# Patient Record
Sex: FEMALE | Race: WHITE | Employment: FULL TIME | URBAN - METROPOLITAN AREA
[De-identification: names, ages, dates, MRNs, and addresses within clinical notes are randomized per-mention and may not be internally consistent; named-entity substitution may affect disease eponyms.]

---

## 2017-01-26 RX ORDER — ATORVASTATIN CALCIUM 10 MG/1
TABLET, FILM COATED ORAL
Qty: 30 TABLET | Refills: 0 | Status: SHIPPED | OUTPATIENT
Start: 2017-01-26 | End: 2017-02-09

## 2017-01-26 RX ORDER — LISINOPRIL 20 MG/1
TABLET ORAL
Qty: 30 TABLET | Refills: 0 | Status: SHIPPED | OUTPATIENT
Start: 2017-01-26 | End: 2017-02-09

## 2017-02-09 ENCOUNTER — OFFICE VISIT (OUTPATIENT)
Dept: FAMILY MEDICINE CLINIC | Facility: CLINIC | Age: 62
End: 2017-02-09

## 2017-02-09 VITALS
DIASTOLIC BLOOD PRESSURE: 72 MMHG | SYSTOLIC BLOOD PRESSURE: 116 MMHG | HEART RATE: 68 BPM | BODY MASS INDEX: 29 KG/M2 | RESPIRATION RATE: 16 BRPM | WEIGHT: 156 LBS | TEMPERATURE: 98 F

## 2017-02-09 DIAGNOSIS — F43.0 STRESS REACTION: ICD-10-CM

## 2017-02-09 DIAGNOSIS — E55.9 VITAMIN D DEFICIENCY: ICD-10-CM

## 2017-02-09 DIAGNOSIS — I10 BENIGN ESSENTIAL HYPERTENSION: Primary | ICD-10-CM

## 2017-02-09 PROCEDURE — 99213 OFFICE O/P EST LOW 20 MIN: CPT | Performed by: FAMILY MEDICINE

## 2017-02-09 RX ORDER — LISINOPRIL 20 MG/1
TABLET ORAL
Qty: 30 TABLET | Refills: 5 | Status: SHIPPED | OUTPATIENT
Start: 2017-02-09 | End: 2017-08-28

## 2017-02-09 RX ORDER — ATORVASTATIN CALCIUM 10 MG/1
TABLET, FILM COATED ORAL
Qty: 30 TABLET | Refills: 5 | Status: SHIPPED | OUTPATIENT
Start: 2017-02-09 | End: 2017-08-28

## 2017-02-09 NOTE — PROGRESS NOTES
CHIEF COMPLAINT:   Emani Gonzalez a 63year old female is here for followup on HTN  HPI:   Emani Gonzalez has been doing well since the last visit here. Emani Gonzalez  is compliant with hypertensive medication. No chest pain. No dyspnea. blood pressures noted while monitoring at home.

## 2017-04-03 RX ORDER — MOMETASONE FUROATE 1 MG/G
CREAM TOPICAL
Qty: 45 G | Refills: 1 | Status: SHIPPED | OUTPATIENT
Start: 2017-04-03 | End: 2018-02-05

## 2017-04-10 ENCOUNTER — OFFICE VISIT (OUTPATIENT)
Dept: FAMILY MEDICINE CLINIC | Facility: CLINIC | Age: 62
End: 2017-04-10

## 2017-04-10 VITALS
SYSTOLIC BLOOD PRESSURE: 131 MMHG | HEIGHT: 62 IN | DIASTOLIC BLOOD PRESSURE: 80 MMHG | OXYGEN SATURATION: 98 % | RESPIRATION RATE: 16 BRPM | HEART RATE: 67 BPM | TEMPERATURE: 98 F

## 2017-04-10 DIAGNOSIS — J01.80 ACUTE NON-RECURRENT SINUSITIS OF OTHER SINUS: Primary | ICD-10-CM

## 2017-04-10 PROCEDURE — 99213 OFFICE O/P EST LOW 20 MIN: CPT | Performed by: FAMILY MEDICINE

## 2017-04-10 RX ORDER — AMOXICILLIN AND CLAVULANATE POTASSIUM 875; 125 MG/1; MG/1
1 TABLET, FILM COATED ORAL 2 TIMES DAILY
Qty: 20 TABLET | Refills: 0 | Status: SHIPPED | OUTPATIENT
Start: 2017-04-10 | End: 2017-04-20

## 2017-04-10 NOTE — PROGRESS NOTES
CHIEF COMPLAINT:   Nasal congestion/sinus pain  HPI:   Left facial pain behind eye. No fever. Ear pain B/L. Started 16 days ago. Cough improved. No hx sinus surgery. Still with lots of nasal drainage. Not improving.       Current Outpatient Prescriptio headaches or dizziness.     EXAM:   /80 mmHg  Pulse 67  Temp(Src) 97.9 °F (36.6 °C) (Oral)  Resp 16  Ht 62\"  Wt   SpO2 98%  GENERAL: well developed, well nourished,in no apparent distress  SKIN: no rashes,no suspicious lesions  EYES:PERRLA, EOMI,conj

## 2017-08-31 RX ORDER — LISINOPRIL 20 MG/1
TABLET ORAL
Qty: 30 TABLET | Refills: 1 | Status: SHIPPED | OUTPATIENT
Start: 2017-08-31 | End: 2017-09-19

## 2017-08-31 RX ORDER — ATORVASTATIN CALCIUM 10 MG/1
TABLET, FILM COATED ORAL
Qty: 30 TABLET | Refills: 1 | Status: SHIPPED | OUTPATIENT
Start: 2017-08-31 | End: 2017-09-19

## 2017-08-31 NOTE — TELEPHONE ENCOUNTER
Pt called requesting update on RX request. She will be going out ot [facility] and would like to pick them up today. Please advise. Thank you.

## 2017-09-19 ENCOUNTER — OFFICE VISIT (OUTPATIENT)
Dept: FAMILY MEDICINE CLINIC | Facility: CLINIC | Age: 62
End: 2017-09-19

## 2017-09-19 VITALS
DIASTOLIC BLOOD PRESSURE: 84 MMHG | SYSTOLIC BLOOD PRESSURE: 150 MMHG | BODY MASS INDEX: 27.6 KG/M2 | HEART RATE: 68 BPM | TEMPERATURE: 98 F | RESPIRATION RATE: 16 BRPM | HEIGHT: 62 IN | WEIGHT: 150 LBS

## 2017-09-19 DIAGNOSIS — M85.80 OSTEOPENIA, UNSPECIFIED LOCATION: ICD-10-CM

## 2017-09-19 DIAGNOSIS — Z13.89 SCREENING FOR GENITOURINARY CONDITION: ICD-10-CM

## 2017-09-19 DIAGNOSIS — Z00.00 BLOOD TESTS FOR ROUTINE GENERAL PHYSICAL EXAMINATION: ICD-10-CM

## 2017-09-19 DIAGNOSIS — Z11.51 SCREENING FOR HPV (HUMAN PAPILLOMAVIRUS): ICD-10-CM

## 2017-09-19 DIAGNOSIS — Z12.31 ENCOUNTER FOR SCREENING MAMMOGRAM FOR MALIGNANT NEOPLASM OF BREAST: ICD-10-CM

## 2017-09-19 DIAGNOSIS — Z00.00 ROUTINE GENERAL MEDICAL EXAMINATION AT A HEALTH CARE FACILITY: Primary | ICD-10-CM

## 2017-09-19 DIAGNOSIS — Z12.4 PAP SMEAR FOR CERVICAL CANCER SCREENING: ICD-10-CM

## 2017-09-19 DIAGNOSIS — Z13.820 SCREENING FOR OSTEOPOROSIS: ICD-10-CM

## 2017-09-19 PROCEDURE — 87624 HPV HI-RISK TYP POOLED RSLT: CPT | Performed by: FAMILY MEDICINE

## 2017-09-19 PROCEDURE — 88175 CYTOPATH C/V AUTO FLUID REDO: CPT | Performed by: FAMILY MEDICINE

## 2017-09-19 PROCEDURE — 99396 PREV VISIT EST AGE 40-64: CPT | Performed by: FAMILY MEDICINE

## 2017-09-19 RX ORDER — LISINOPRIL 20 MG/1
TABLET ORAL
Qty: 90 TABLET | Refills: 1 | Status: CANCELLED | OUTPATIENT
Start: 2017-09-19

## 2017-09-19 RX ORDER — ATORVASTATIN CALCIUM 10 MG/1
TABLET, FILM COATED ORAL
Qty: 90 TABLET | Refills: 1 | Status: SHIPPED | OUTPATIENT
Start: 2017-09-19 | End: 2018-09-21

## 2017-09-19 RX ORDER — LISINOPRIL AND HYDROCHLOROTHIAZIDE 20; 12.5 MG/1; MG/1
1 TABLET ORAL DAILY
Qty: 90 TABLET | Refills: 0 | Status: SHIPPED | OUTPATIENT
Start: 2017-09-19 | End: 2017-12-13

## 2017-09-19 NOTE — PROGRESS NOTES
CHIEF COMPLAINT       Annual Physical Exam  HPI:   Inga Diaz is a 63year old female who presents for a complete physical exam.     Wt Readings from Last 6 Encounters:  09/19/17 : 150 lb  02/09/17 : 156 lb  07/27/16 : 152 lb  06/29/16 : 153 lb  0 Packs/day: 0.50      Years: 0.00         Types: Cigarettes  Smokeless tobacco: Never Used                      Alcohol use:  Yes              Comment: 1-2 drinks/month/max         REVIEW OF SYSTEMS:   GENERAL: feels well otherwise  SK is 27.44 kg/m². [place] Tram Recommended low fat diet and aerobic exercise 30 minutes three times weekly. The patient indicates understanding of these issues and agrees to the plan. Change to lisinopril HCTZ 20/12.5 once daily.   Follow up for recheck of BP in 6 weeks

## 2017-09-22 LAB — HPV I/H RISK 1 DNA SPEC QL NAA+PROBE: NEGATIVE

## 2017-10-17 ENCOUNTER — LAB ENCOUNTER (OUTPATIENT)
Dept: LAB | Age: 62
End: 2017-10-17
Attending: FAMILY MEDICINE
Payer: COMMERCIAL

## 2017-10-17 DIAGNOSIS — Z13.89 SCREENING FOR GENITOURINARY CONDITION: ICD-10-CM

## 2017-10-17 DIAGNOSIS — E55.9 VITAMIN D DEFICIENCY: ICD-10-CM

## 2017-10-17 DIAGNOSIS — Z00.00 BLOOD TESTS FOR ROUTINE GENERAL PHYSICAL EXAMINATION: ICD-10-CM

## 2017-10-17 PROCEDURE — 82306 VITAMIN D 25 HYDROXY: CPT | Performed by: FAMILY MEDICINE

## 2017-10-17 PROCEDURE — 80061 LIPID PANEL: CPT | Performed by: FAMILY MEDICINE

## 2017-10-17 PROCEDURE — 81001 URINALYSIS AUTO W/SCOPE: CPT | Performed by: FAMILY MEDICINE

## 2017-10-17 PROCEDURE — 80050 GENERAL HEALTH PANEL: CPT | Performed by: FAMILY MEDICINE

## 2017-10-17 PROCEDURE — 36415 COLL VENOUS BLD VENIPUNCTURE: CPT | Performed by: FAMILY MEDICINE

## 2017-12-14 RX ORDER — LISINOPRIL AND HYDROCHLOROTHIAZIDE 20; 12.5 MG/1; MG/1
TABLET ORAL
Qty: 30 TABLET | Refills: 3 | Status: SHIPPED | OUTPATIENT
Start: 2017-12-14 | End: 2018-02-05

## 2018-02-05 ENCOUNTER — OFFICE VISIT (OUTPATIENT)
Dept: FAMILY MEDICINE CLINIC | Facility: CLINIC | Age: 63
End: 2018-02-05

## 2018-02-05 VITALS
SYSTOLIC BLOOD PRESSURE: 86 MMHG | BODY MASS INDEX: 27.77 KG/M2 | DIASTOLIC BLOOD PRESSURE: 56 MMHG | WEIGHT: 149 LBS | TEMPERATURE: 99 F | RESPIRATION RATE: 12 BRPM | HEART RATE: 64 BPM | HEIGHT: 61.5 IN

## 2018-02-05 DIAGNOSIS — L30.9 ECZEMA, UNSPECIFIED TYPE: ICD-10-CM

## 2018-02-05 DIAGNOSIS — I10 BENIGN ESSENTIAL HYPERTENSION: Primary | ICD-10-CM

## 2018-02-05 PROCEDURE — 99213 OFFICE O/P EST LOW 20 MIN: CPT | Performed by: FAMILY MEDICINE

## 2018-02-05 RX ORDER — LISINOPRIL 20 MG/1
20 TABLET ORAL DAILY
Qty: 30 TABLET | Refills: 5 | Status: SHIPPED | OUTPATIENT
Start: 2018-02-05 | End: 2018-07-26

## 2018-02-05 RX ORDER — MOMETASONE FUROATE 1 MG/G
CREAM TOPICAL
Qty: 45 G | Refills: 1 | Status: SHIPPED | OUTPATIENT
Start: 2018-02-05 | End: 2018-09-21

## 2018-02-05 NOTE — PROGRESS NOTES
CHIEF COMPLAINT:   Michael Menard a 63year old female is here for followup on HTN  HPI:   Michael Menard has been doing well since the last visit here. Michael Menard  is compliant with hypertensive medication. No chest pain. No dyspnea. Caution with position changes since her blood pressure has been running low recently.

## 2018-02-16 ENCOUNTER — MED REC SCAN ONLY (OUTPATIENT)
Dept: OBGYN CLINIC | Facility: CLINIC | Age: 63
End: 2018-02-16

## 2018-03-13 ENCOUNTER — PATIENT MESSAGE (OUTPATIENT)
Dept: FAMILY MEDICINE CLINIC | Facility: CLINIC | Age: 63
End: 2018-03-13

## 2018-07-27 RX ORDER — LISINOPRIL 20 MG/1
TABLET ORAL
Qty: 30 TABLET | Refills: 0 | Status: SHIPPED | OUTPATIENT
Start: 2018-07-27 | End: 2018-09-05

## 2018-07-27 NOTE — TELEPHONE ENCOUNTER
Please call pt for medication follow up in 1 month or 2 months for physical with Galen Ahmadi. Thanks.

## 2018-08-27 NOTE — TELEPHONE ENCOUNTER
Not protocol medication. LOV :2/05/18 for hypertension   Last labs done :10/17/17  Next appointment :9/21/18  Please see pending medication. Refill if appropriate.    Last refill:    Date:4/27/18  Amount :30 tablets 3 refills   Medication: sertraline hcl

## 2018-09-06 RX ORDER — LISINOPRIL 20 MG/1
TABLET ORAL
Qty: 30 TABLET | Refills: 0 | Status: SHIPPED | OUTPATIENT
Start: 2018-09-06 | End: 2018-09-21

## 2018-09-21 ENCOUNTER — OFFICE VISIT (OUTPATIENT)
Dept: FAMILY MEDICINE CLINIC | Facility: CLINIC | Age: 63
End: 2018-09-21
Payer: COMMERCIAL

## 2018-09-21 VITALS
DIASTOLIC BLOOD PRESSURE: 76 MMHG | BODY MASS INDEX: 25.95 KG/M2 | SYSTOLIC BLOOD PRESSURE: 118 MMHG | HEIGHT: 62 IN | HEART RATE: 60 BPM | TEMPERATURE: 98 F | RESPIRATION RATE: 12 BRPM | WEIGHT: 141 LBS

## 2018-09-21 DIAGNOSIS — Z00.00 ROUTINE GENERAL MEDICAL EXAMINATION AT A HEALTH CARE FACILITY: Primary | ICD-10-CM

## 2018-09-21 DIAGNOSIS — Z13.89 SCREENING FOR GENITOURINARY CONDITION: ICD-10-CM

## 2018-09-21 DIAGNOSIS — Z00.00 BLOOD TESTS FOR ROUTINE GENERAL PHYSICAL EXAMINATION: ICD-10-CM

## 2018-09-21 DIAGNOSIS — M79.671 RIGHT FOOT PAIN: ICD-10-CM

## 2018-09-21 DIAGNOSIS — Z12.31 ENCOUNTER FOR SCREENING MAMMOGRAM FOR MALIGNANT NEOPLASM OF BREAST: ICD-10-CM

## 2018-09-21 DIAGNOSIS — Z13.820 SCREENING FOR OSTEOPOROSIS: ICD-10-CM

## 2018-09-21 PROCEDURE — 99212 OFFICE O/P EST SF 10 MIN: CPT | Performed by: FAMILY MEDICINE

## 2018-09-21 PROCEDURE — 99396 PREV VISIT EST AGE 40-64: CPT | Performed by: FAMILY MEDICINE

## 2018-09-21 RX ORDER — LISINOPRIL 20 MG/1
TABLET ORAL
Qty: 90 TABLET | Refills: 1 | Status: SHIPPED | OUTPATIENT
Start: 2018-09-21 | End: 2019-03-30

## 2018-09-21 RX ORDER — MOMETASONE FUROATE 1 MG/G
CREAM TOPICAL
Qty: 45 G | Refills: 1 | Status: SHIPPED | OUTPATIENT
Start: 2018-09-21 | End: 2019-04-15

## 2018-09-21 RX ORDER — ATORVASTATIN CALCIUM 10 MG/1
TABLET, FILM COATED ORAL
Qty: 90 TABLET | Refills: 1 | Status: SHIPPED | OUTPATIENT
Start: 2018-09-21 | End: 2019-03-30

## 2018-09-21 NOTE — PROGRESS NOTES
CHIEF COMPLAINT       Annual Physical Exam  HPI:   Jennifer De Santiago is a 63year old female who presents for a complete physical exam.   Abnormal pap in her 25s - had a \"scraping\" - since then normal.    Right foot pain for a couple weeks.   Walking an aneurysm[other]) Father    • Heart Disease Father    • High Cholesterol Mother    • Asthma Maternal Grandfather    • Other (emphysema[other]) Maternal Grandfather    • Other (MS[other]) Brother       Social History:   Social History    Tobacco Use      Smo sensation are intact. No swelling or deformity noted.   EXTREMITIES: no cyanosis, clubbing or edema  NEURO: Oriented times three,cranial nerves are intact,motor and sensory are grossly intact    ASSESSMENT AND PLAN:   Paolo Richard is a 63year old f

## 2018-09-21 NOTE — PATIENT INSTRUCTIONS
Try claritin (loratadine) 10 mg once daily. Check on insurance coverage for Shingrix.   If covered, then ask your insurance if you should do it at the [address] [place] [address] office or pharmacy

## 2018-09-27 ENCOUNTER — HOSPITAL ENCOUNTER (OUTPATIENT)
Dept: GENERAL RADIOLOGY | Age: 63
Discharge: HOME | End: 2018-09-27
Attending: FAMILY MEDICINE
Payer: COMMERCIAL

## 2018-09-27 DIAGNOSIS — M79.671 RIGHT FOOT PAIN: ICD-10-CM

## 2018-09-27 PROCEDURE — 73630 X-RAY EXAM OF FOOT: CPT | Performed by: FAMILY MEDICINE

## 2019-04-01 ENCOUNTER — LAB ENCOUNTER (OUTPATIENT)
Dept: LAB | Age: 64
End: 2019-04-01
Attending: FAMILY MEDICINE
Payer: COMMERCIAL

## 2019-04-01 DIAGNOSIS — Z13.89 SCREENING FOR GENITOURINARY CONDITION: ICD-10-CM

## 2019-04-01 DIAGNOSIS — R94.6 ABNORMAL THYROID FUNCTION TEST: Primary | ICD-10-CM

## 2019-04-01 DIAGNOSIS — R94.6 ABNORMAL THYROID FUNCTION TEST: ICD-10-CM

## 2019-04-01 DIAGNOSIS — Z00.00 BLOOD TESTS FOR ROUTINE GENERAL PHYSICAL EXAMINATION: ICD-10-CM

## 2019-04-01 PROCEDURE — 80050 GENERAL HEALTH PANEL: CPT | Performed by: FAMILY MEDICINE

## 2019-04-01 PROCEDURE — 36415 COLL VENOUS BLD VENIPUNCTURE: CPT | Performed by: FAMILY MEDICINE

## 2019-04-01 PROCEDURE — 80061 LIPID PANEL: CPT | Performed by: FAMILY MEDICINE

## 2019-04-01 PROCEDURE — 84439 ASSAY OF FREE THYROXINE: CPT | Performed by: FAMILY MEDICINE

## 2019-04-01 PROCEDURE — 81003 URINALYSIS AUTO W/O SCOPE: CPT | Performed by: FAMILY MEDICINE

## 2019-04-01 RX ORDER — ATORVASTATIN CALCIUM 10 MG/1
TABLET, FILM COATED ORAL
Qty: 30 TABLET | Refills: 0 | Status: SHIPPED | OUTPATIENT
Start: 2019-04-01 | End: 2019-04-15

## 2019-04-01 RX ORDER — LISINOPRIL 20 MG/1
TABLET ORAL
Qty: 30 TABLET | Refills: 0 | Status: SHIPPED | OUTPATIENT
Start: 2019-04-01 | End: 2019-04-15

## 2019-04-15 ENCOUNTER — OFFICE VISIT (OUTPATIENT)
Dept: FAMILY MEDICINE CLINIC | Facility: CLINIC | Age: 64
End: 2019-04-15
Payer: COMMERCIAL

## 2019-04-15 VITALS
WEIGHT: 140 LBS | HEART RATE: 70 BPM | DIASTOLIC BLOOD PRESSURE: 78 MMHG | RESPIRATION RATE: 14 BRPM | TEMPERATURE: 99 F | HEIGHT: 62 IN | BODY MASS INDEX: 25.76 KG/M2 | SYSTOLIC BLOOD PRESSURE: 110 MMHG

## 2019-04-15 DIAGNOSIS — R79.89 ELEVATED TSH: ICD-10-CM

## 2019-04-15 DIAGNOSIS — F43.0 STRESS REACTION: ICD-10-CM

## 2019-04-15 DIAGNOSIS — E03.9 ACQUIRED HYPOTHYROIDISM: ICD-10-CM

## 2019-04-15 DIAGNOSIS — E78.00 HYPERCHOLESTEREMIA: ICD-10-CM

## 2019-04-15 DIAGNOSIS — I10 BENIGN ESSENTIAL HYPERTENSION: Primary | ICD-10-CM

## 2019-04-15 PROCEDURE — 99214 OFFICE O/P EST MOD 30 MIN: CPT | Performed by: FAMILY MEDICINE

## 2019-04-15 RX ORDER — LISINOPRIL 20 MG/1
TABLET ORAL
Qty: 90 TABLET | Refills: 1 | Status: SHIPPED | OUTPATIENT
Start: 2019-04-15 | End: 2019-10-28

## 2019-04-15 RX ORDER — ATORVASTATIN CALCIUM 10 MG/1
TABLET, FILM COATED ORAL
Qty: 90 TABLET | Refills: 1 | Status: SHIPPED | OUTPATIENT
Start: 2019-04-15 | End: 2019-10-28

## 2019-04-15 RX ORDER — MOMETASONE FUROATE 1 MG/G
CREAM TOPICAL
Qty: 45 G | Refills: 1 | Status: SHIPPED | OUTPATIENT
Start: 2019-04-15 | End: 2019-11-22

## 2019-04-15 NOTE — PATIENT INSTRUCTIONS
Check thyroid labs first part of June. Check on insurance coverage for Shingrix. If covered, then ask your insurance if you should do it at the [address] [place] [address] office or pharmacy. We don't currently have it available in our office.

## 2019-04-15 NOTE — PROGRESS NOTES
CHIEF COMPLAINT:   Kelley Perea a 66year old female is here for followup on HTN  HPI:   Kelley Perea has been doing well since the last visit here. Kelley Perea  is compliant with hypertensive medication. No chest pain. No dyspnea. labs in 2 months including antibodies. Call sooner if any hypothyroidism type of symptoms. Will start medication if TSH is still mildly elevated and T4 is low. Reviewed thyroid pathophysiology with the patient. Continue current management.   Continue to

## 2019-06-19 ENCOUNTER — WALK IN (OUTPATIENT)
Dept: URGENT CARE | Age: 64
End: 2019-06-19

## 2019-06-19 VITALS
HEART RATE: 78 BPM | BODY MASS INDEX: 24.41 KG/M2 | TEMPERATURE: 98.3 F | SYSTOLIC BLOOD PRESSURE: 116 MMHG | DIASTOLIC BLOOD PRESSURE: 58 MMHG | RESPIRATION RATE: 17 BRPM | WEIGHT: 143 LBS | HEIGHT: 64 IN

## 2019-06-19 DIAGNOSIS — B34.9 VIRAL ILLNESS: Primary | ICD-10-CM

## 2019-06-19 PROCEDURE — 99203 OFFICE O/P NEW LOW 30 MIN: CPT | Performed by: NURSE PRACTITIONER

## 2019-06-19 ASSESSMENT — ENCOUNTER SYMPTOMS
SINUS PAIN: 1
RESPIRATORY NEGATIVE: 1
SORE THROAT: 0
FATIGUE: 1
SWOLLEN GLANDS: 0
COUGH: 0
SINUS PRESSURE: 1
ANAL BLEEDING: 0
HEADACHES: 1
ENDOCRINE NEGATIVE: 1
RHINORRHEA: 0
EYES NEGATIVE: 1
FEVER: 0
CHILLS: 0
CONSTIPATION: 0

## 2019-10-30 RX ORDER — ATORVASTATIN CALCIUM 10 MG/1
TABLET, FILM COATED ORAL
Qty: 30 TABLET | Refills: 0 | Status: SHIPPED | OUTPATIENT
Start: 2019-10-30 | End: 2019-11-22

## 2019-10-30 RX ORDER — LISINOPRIL 20 MG/1
TABLET ORAL
Qty: 30 TABLET | Refills: 0 | Status: SHIPPED | OUTPATIENT
Start: 2019-10-30 | End: 2019-11-22

## 2019-11-22 NOTE — PROGRESS NOTES
CHIEF COMPLAINT:   Star Dunham a 60year old female is here for followup on HTN  HPI:   Star Dunham has been doing well since the last visit here. Star Dunham  is compliant with hypertensive medication. No chest pain. No dyspnea. MG Oral Tab 90 tablet 1     Sig: TAKE 1 TABLET BY MOUTH ONE TIME A DAY   • lisinopril 20 MG Oral Tab 90 tablet 1     Sig: TAKE 1 TABLET BY MOUTH ONE TIME A DAY   • atorvastatin 10 MG Oral Tab 90 tablet 1     Sig: TAKE 1 TABLET BY MOUTH DAILY IN THE EVENING

## 2019-11-25 ENCOUNTER — MED REC SCAN ONLY (OUTPATIENT)
Dept: FAMILY MEDICINE CLINIC | Facility: CLINIC | Age: 64
End: 2019-11-25

## 2020-03-02 ENCOUNTER — OFFICE VISIT (OUTPATIENT)
Dept: FAMILY MEDICINE CLINIC | Facility: CLINIC | Age: 65
End: 2020-03-02
Payer: COMMERCIAL

## 2020-03-02 VITALS
SYSTOLIC BLOOD PRESSURE: 130 MMHG | TEMPERATURE: 99 F | HEART RATE: 80 BPM | WEIGHT: 151 LBS | RESPIRATION RATE: 12 BRPM | BODY MASS INDEX: 27.79 KG/M2 | DIASTOLIC BLOOD PRESSURE: 70 MMHG | HEIGHT: 62 IN

## 2020-03-02 DIAGNOSIS — R52 BODY ACHES: ICD-10-CM

## 2020-03-02 DIAGNOSIS — J02.9 SORE THROAT: ICD-10-CM

## 2020-03-02 DIAGNOSIS — R05.9 COUGH: Primary | ICD-10-CM

## 2020-03-02 PROCEDURE — 99213 OFFICE O/P EST LOW 20 MIN: CPT | Performed by: FAMILY MEDICINE

## 2020-03-02 NOTE — PROGRESS NOTES
CHIEF COMPLAINT:   Sick  HPI:   Friend recently diagnosed with influenza A. Started about 5 days ago with sore throat, aching, fatigue. Still with a lot of fatigue and cough starting. Taking Mucinex. Had some sweating at night - didn't check temp.   Feel palpitations.   GI: no complaint of nausea or abdominal pain  NEURO: no complaint of neurologic deficits    EXAM:   /70   Pulse 80   Temp 98.8 °F (37.1 °C) (Oral)   Resp 12   Ht 62\"   Wt 151 lb (68.5 kg)   BMI 27.62 kg/m²   GENERAL: well developed, w

## 2020-03-09 ENCOUNTER — TELEPHONE (OUTPATIENT)
Dept: FAMILY MEDICINE CLINIC | Facility: CLINIC | Age: 65
End: 2020-03-09

## 2020-06-08 RX ORDER — ATORVASTATIN CALCIUM 10 MG/1
TABLET, FILM COATED ORAL
Qty: 90 TABLET | Refills: 0 | Status: SHIPPED | OUTPATIENT
Start: 2020-06-08 | End: 2020-10-09

## 2020-06-08 RX ORDER — LISINOPRIL 20 MG/1
TABLET ORAL
Qty: 90 TABLET | Refills: 0 | Status: SHIPPED | OUTPATIENT
Start: 2020-06-08 | End: 2020-10-09

## 2020-06-17 ENCOUNTER — TELEPHONE (OUTPATIENT)
Dept: FAMILY MEDICINE CLINIC | Facility: CLINIC | Age: 65
End: 2020-06-17

## 2020-06-17 NOTE — TELEPHONE ENCOUNTER
Since there are different brands of nicotine patches available OTC, I would suggest following the instructions on the package since I'm not familiar with all of the brands.

## 2020-07-24 ENCOUNTER — MED REC SCAN ONLY (OUTPATIENT)
Dept: FAMILY MEDICINE CLINIC | Facility: CLINIC | Age: 65
End: 2020-07-24

## 2020-10-09 RX ORDER — ATORVASTATIN CALCIUM 10 MG/1
TABLET, FILM COATED ORAL
Qty: 30 TABLET | Refills: 0 | Status: SHIPPED | OUTPATIENT
Start: 2020-10-09 | End: 2020-10-30

## 2020-10-09 RX ORDER — LISINOPRIL 20 MG/1
TABLET ORAL
Qty: 30 TABLET | Refills: 0 | Status: SHIPPED | OUTPATIENT
Start: 2020-10-09 | End: 2020-10-30

## 2020-10-30 ENCOUNTER — OFFICE VISIT (OUTPATIENT)
Dept: FAMILY MEDICINE CLINIC | Facility: CLINIC | Age: 65
End: 2020-10-30
Payer: COMMERCIAL

## 2020-10-30 VITALS
TEMPERATURE: 98 F | HEART RATE: 76 BPM | RESPIRATION RATE: 12 BRPM | SYSTOLIC BLOOD PRESSURE: 130 MMHG | DIASTOLIC BLOOD PRESSURE: 80 MMHG

## 2020-10-30 DIAGNOSIS — Z12.11 SCREENING FOR COLORECTAL CANCER: ICD-10-CM

## 2020-10-30 DIAGNOSIS — I10 BENIGN ESSENTIAL HYPERTENSION: Primary | ICD-10-CM

## 2020-10-30 DIAGNOSIS — Z23 NEED FOR VACCINATION: ICD-10-CM

## 2020-10-30 DIAGNOSIS — E55.9 VITAMIN D DEFICIENCY: ICD-10-CM

## 2020-10-30 DIAGNOSIS — Z12.12 SCREENING FOR COLORECTAL CANCER: ICD-10-CM

## 2020-10-30 DIAGNOSIS — F43.0 STRESS REACTION: ICD-10-CM

## 2020-10-30 DIAGNOSIS — R79.89 ELEVATED TSH: ICD-10-CM

## 2020-10-30 PROCEDURE — 90686 IIV4 VACC NO PRSV 0.5 ML IM: CPT | Performed by: FAMILY MEDICINE

## 2020-10-30 PROCEDURE — 3079F DIAST BP 80-89 MM HG: CPT | Performed by: FAMILY MEDICINE

## 2020-10-30 PROCEDURE — 3008F BODY MASS INDEX DOCD: CPT | Performed by: FAMILY MEDICINE

## 2020-10-30 PROCEDURE — 3075F SYST BP GE 130 - 139MM HG: CPT | Performed by: FAMILY MEDICINE

## 2020-10-30 PROCEDURE — 99214 OFFICE O/P EST MOD 30 MIN: CPT | Performed by: FAMILY MEDICINE

## 2020-10-30 PROCEDURE — 90471 IMMUNIZATION ADMIN: CPT | Performed by: FAMILY MEDICINE

## 2020-10-30 RX ORDER — ATORVASTATIN CALCIUM 10 MG/1
TABLET, FILM COATED ORAL
Qty: 90 TABLET | Refills: 1 | Status: SHIPPED | OUTPATIENT
Start: 2020-10-30 | End: 2021-05-25

## 2020-10-30 RX ORDER — MOMETASONE FUROATE 1 MG/G
CREAM TOPICAL
Qty: 45 G | Refills: 1 | Status: SHIPPED | OUTPATIENT
Start: 2020-10-30 | End: 2021-08-24

## 2020-10-30 RX ORDER — LISINOPRIL 20 MG/1
20 TABLET ORAL DAILY
Qty: 90 TABLET | Refills: 1 | Status: SHIPPED | OUTPATIENT
Start: 2020-10-30 | End: 2021-05-24

## 2020-10-30 NOTE — PROGRESS NOTES
CHIEF COMPLAINT:   Star Dunham a 63year old female is here for followup on HTN  HPI:   Star Dunham has been doing well since the last visit here. Star Dunham  is compliant with hypertensive medication. No chest pain. No dyspnea. BY MOUTH DAILY IN THE EVENING   • Mometasone Furoate 0.1 % External Cream 45 g 1     Sig: Apply BID. Max two weeks. Schedule complete physical. Will check on colonoscopy - hyperplastic polyps so likely not due to 2025 but will confirm with GI.   Contin

## 2020-10-30 NOTE — PATIENT INSTRUCTIONS
Schedule complete physical.     Check on insurance coverage for Shingrix. If covered, then ask your insurance if you should do it at the [address] [place] [address] office or pharmacy.   Schedule a nurse visit or we can do it at your physical.

## 2020-12-17 ENCOUNTER — PATIENT MESSAGE (OUTPATIENT)
Dept: FAMILY MEDICINE CLINIC | Facility: CLINIC | Age: 65
End: 2020-12-17

## 2020-12-17 ENCOUNTER — TELEMEDICINE (OUTPATIENT)
Dept: FAMILY MEDICINE CLINIC | Facility: CLINIC | Age: 65
End: 2020-12-17
Payer: COMMERCIAL

## 2020-12-17 DIAGNOSIS — J01.90 ACUTE NON-RECURRENT SINUSITIS, UNSPECIFIED LOCATION: Primary | ICD-10-CM

## 2020-12-17 PROCEDURE — 99213 OFFICE O/P EST LOW 20 MIN: CPT | Performed by: FAMILY MEDICINE

## 2020-12-17 RX ORDER — AMOXICILLIN 875 MG/1
875 TABLET, COATED ORAL 2 TIMES DAILY
Qty: 20 TABLET | Refills: 0 | Status: SHIPPED | OUTPATIENT
Start: 2020-12-17 | End: 2021-05-24 | Stop reason: ALTCHOICE

## 2020-12-17 NOTE — PROGRESS NOTES
Jeremy Gaona is a 63year old female. CHIEF COMPLAINT   Sinus infection  HPI:   This visit is conducted using Telemedicine with live, interactive video and audio.  Patient understands and accepts financial responsibility for any deductible, co-insur Visit:  Requested Prescriptions     Signed Prescriptions Disp Refills   • amoxicillin 875 MG Oral Tab 20 tablet 0     Sig: Take 1 tablet (875 mg total) by mouth 2 (two) times daily.      Quarantine/isolate from mom - patient had a negative rapid Covid test

## 2021-05-24 ENCOUNTER — OFFICE VISIT (OUTPATIENT)
Dept: FAMILY MEDICINE CLINIC | Facility: CLINIC | Age: 66
End: 2021-05-24
Payer: COMMERCIAL

## 2021-05-24 VITALS
SYSTOLIC BLOOD PRESSURE: 116 MMHG | DIASTOLIC BLOOD PRESSURE: 56 MMHG | HEART RATE: 84 BPM | WEIGHT: 158 LBS | HEIGHT: 62 IN | BODY MASS INDEX: 29.08 KG/M2 | RESPIRATION RATE: 12 BRPM

## 2021-05-24 DIAGNOSIS — I10 BENIGN ESSENTIAL HYPERTENSION: Primary | ICD-10-CM

## 2021-05-24 DIAGNOSIS — R79.89 ELEVATED TSH: ICD-10-CM

## 2021-05-24 DIAGNOSIS — Z13.220 SCREENING FOR LIPID DISORDERS: ICD-10-CM

## 2021-05-24 DIAGNOSIS — E55.9 VITAMIN D DEFICIENCY: ICD-10-CM

## 2021-05-24 PROCEDURE — 3008F BODY MASS INDEX DOCD: CPT | Performed by: FAMILY MEDICINE

## 2021-05-24 PROCEDURE — 99214 OFFICE O/P EST MOD 30 MIN: CPT | Performed by: FAMILY MEDICINE

## 2021-05-24 PROCEDURE — 3078F DIAST BP <80 MM HG: CPT | Performed by: FAMILY MEDICINE

## 2021-05-24 PROCEDURE — 3074F SYST BP LT 130 MM HG: CPT | Performed by: FAMILY MEDICINE

## 2021-05-24 RX ORDER — LISINOPRIL 20 MG/1
20 TABLET ORAL DAILY
Qty: 90 TABLET | Refills: 1 | Status: SHIPPED | OUTPATIENT
Start: 2021-05-24

## 2021-05-24 NOTE — PATIENT INSTRUCTIONS
Check fasting blood work. Schedule physical/pap over the summer. Check on insurance coverage for Shingrix. If covered, then ask your insurance if you should do it at the [address] [place] [address] office or pharmacy. We do currently have it available in our office.

## 2021-05-24 NOTE — PROGRESS NOTES
CHIEF COMPLAINT:   Yuri Aguilar a 63year old female is here for followup on HTN  HPI:   Yuri Aguilar has been doing well since the last visit here. Yuri Aguilar  is compliant with hypertensive medication.   No side effects with medica

## 2021-05-25 RX ORDER — LISINOPRIL 20 MG/1
TABLET ORAL
Qty: 90 TABLET | Refills: 0 | OUTPATIENT
Start: 2021-05-25

## 2021-05-25 RX ORDER — ATORVASTATIN CALCIUM 10 MG/1
TABLET, FILM COATED ORAL
Qty: 90 TABLET | Refills: 0 | Status: SHIPPED | OUTPATIENT
Start: 2021-05-25 | End: 2021-06-04

## 2021-05-28 RX ORDER — LISINOPRIL 20 MG/1
TABLET ORAL
Qty: 90 TABLET | Refills: 0 | OUTPATIENT
Start: 2021-05-28

## 2021-05-28 RX ORDER — ATORVASTATIN CALCIUM 10 MG/1
TABLET, FILM COATED ORAL
Qty: 90 TABLET | Refills: 0 | OUTPATIENT
Start: 2021-05-28

## 2021-06-04 ENCOUNTER — TELEPHONE (OUTPATIENT)
Dept: FAMILY MEDICINE CLINIC | Facility: CLINIC | Age: 66
End: 2021-06-04

## 2021-06-04 RX ORDER — ATORVASTATIN CALCIUM 10 MG/1
TABLET, FILM COATED ORAL
Qty: 90 TABLET | Refills: 0 | Status: SHIPPED | OUTPATIENT
Start: 2021-06-04 | End: 2021-11-26

## 2021-06-04 RX ORDER — ATORVASTATIN CALCIUM 10 MG/1
TABLET, FILM COATED ORAL
Qty: 90 TABLET | Refills: 0 | OUTPATIENT
Start: 2021-06-04

## 2021-06-04 NOTE — TELEPHONE ENCOUNTER
Pt states the pharmacy told her that they did not receive the refills that were sent. Please send again. Pt states she is out of her meds.     Pharmacy    [id number] - [address] [facility], [phone]           SERTRALINE HCL 50

## 2021-06-04 NOTE — TELEPHONE ENCOUNTER
Call from mary/pharmacist/[facility]-sts never received e-scripts sent last wk for sertraline, atorvastatin and lisinopril. Requesting verbal orders. Record shows sertraline and atorvastatin orders sent to [facility] 5/25 w confirmation of receipt.  Lisinopril

## 2021-08-25 RX ORDER — MOMETASONE FUROATE 1 MG/G
CREAM TOPICAL
Qty: 45 G | Refills: 1 | Status: SHIPPED | OUTPATIENT
Start: 2021-08-25

## 2021-11-26 RX ORDER — ATORVASTATIN CALCIUM 10 MG/1
TABLET, FILM COATED ORAL
Qty: 30 TABLET | Refills: 0 | Status: SHIPPED | OUTPATIENT
Start: 2021-11-26 | End: 2021-12-20

## 2021-11-27 ENCOUNTER — PATIENT MESSAGE (OUTPATIENT)
Dept: FAMILY MEDICINE CLINIC | Facility: CLINIC | Age: 66
End: 2021-11-27

## 2021-12-15 NOTE — TELEPHONE ENCOUNTER
Please call patient to schedule follow up and then route back to fill med. Also make sure she is aware of new location.

## 2021-12-20 RX ORDER — ATORVASTATIN CALCIUM 10 MG/1
TABLET, FILM COATED ORAL
Qty: 90 TABLET | Refills: 0 | Status: SHIPPED | OUTPATIENT
Start: 2021-12-20 | End: 2021-12-30

## 2021-12-30 RX ORDER — ATORVASTATIN CALCIUM 10 MG/1
TABLET, FILM COATED ORAL
Qty: 30 TABLET | Refills: 0 | Status: SHIPPED | OUTPATIENT
Start: 2021-12-30 | End: 2022-01-10

## 2022-01-10 RX ORDER — ATORVASTATIN CALCIUM 10 MG/1
TABLET, FILM COATED ORAL
Qty: 30 TABLET | Refills: 0 | Status: SHIPPED | OUTPATIENT
Start: 2022-01-10

## 2022-01-10 NOTE — TELEPHONE ENCOUNTER
LOV: 5/24/21 (medication follow up)  Last Refill: 12/30/21, #30, 0 RF (for both)  Next OV: 1/25/22    Please authorize if acceptable. Thank you!

## 2022-02-10 ENCOUNTER — OFFICE VISIT (OUTPATIENT)
Dept: FAMILY MEDICINE CLINIC | Facility: CLINIC | Age: 67
End: 2022-02-10
Payer: COMMERCIAL

## 2022-02-10 VITALS
SYSTOLIC BLOOD PRESSURE: 136 MMHG | TEMPERATURE: 98 F | BODY MASS INDEX: 28.52 KG/M2 | WEIGHT: 155 LBS | HEART RATE: 88 BPM | RESPIRATION RATE: 16 BRPM | HEIGHT: 62 IN | DIASTOLIC BLOOD PRESSURE: 80 MMHG

## 2022-02-10 DIAGNOSIS — I10 BENIGN ESSENTIAL HYPERTENSION: Primary | ICD-10-CM

## 2022-02-10 DIAGNOSIS — L40.9 PSORIASIS: ICD-10-CM

## 2022-02-10 DIAGNOSIS — F43.0 STRESS REACTION: ICD-10-CM

## 2022-02-10 DIAGNOSIS — E78.00 HYPERCHOLESTEREMIA: ICD-10-CM

## 2022-02-10 PROCEDURE — 3079F DIAST BP 80-89 MM HG: CPT | Performed by: FAMILY MEDICINE

## 2022-02-10 PROCEDURE — 3008F BODY MASS INDEX DOCD: CPT | Performed by: FAMILY MEDICINE

## 2022-02-10 PROCEDURE — 90471 IMMUNIZATION ADMIN: CPT | Performed by: FAMILY MEDICINE

## 2022-02-10 PROCEDURE — 3075F SYST BP GE 130 - 139MM HG: CPT | Performed by: FAMILY MEDICINE

## 2022-02-10 PROCEDURE — 99214 OFFICE O/P EST MOD 30 MIN: CPT | Performed by: FAMILY MEDICINE

## 2022-02-10 PROCEDURE — 90750 HZV VACC RECOMBINANT IM: CPT | Performed by: FAMILY MEDICINE

## 2022-02-10 RX ORDER — ATORVASTATIN CALCIUM 10 MG/1
10 TABLET, FILM COATED ORAL EVERY EVENING
Qty: 90 TABLET | Refills: 0 | Status: SHIPPED | OUTPATIENT
Start: 2022-02-10 | End: 2022-03-07

## 2022-02-10 RX ORDER — LISINOPRIL 20 MG/1
20 TABLET ORAL DAILY
Qty: 90 TABLET | Refills: 1 | Status: ON HOLD | OUTPATIENT
Start: 2022-02-10

## 2022-02-22 RX ORDER — ATORVASTATIN CALCIUM 10 MG/1
TABLET, FILM COATED ORAL
Qty: 30 TABLET | Refills: 0 | OUTPATIENT
Start: 2022-02-22

## 2022-02-23 ENCOUNTER — MED REC SCAN ONLY (OUTPATIENT)
Dept: FAMILY MEDICINE CLINIC | Facility: CLINIC | Age: 67
End: 2022-02-23

## 2022-03-06 ENCOUNTER — TELEPHONE (OUTPATIENT)
Dept: FAMILY MEDICINE CLINIC | Facility: CLINIC | Age: 67
End: 2022-03-06

## 2022-03-06 ENCOUNTER — MOBILE ENCOUNTER (OUTPATIENT)
Dept: FAMILY MEDICINE CLINIC | Facility: CLINIC | Age: 67
End: 2022-03-06

## 2022-03-06 RX ORDER — ORPHENADRINE CITRATE 100 MG/1
100 TABLET, EXTENDED RELEASE ORAL 2 TIMES DAILY
Qty: 10 TABLET | Refills: 0 | Status: SHIPPED | OUTPATIENT
Start: 2022-03-06 | End: 2022-03-08

## 2022-03-06 NOTE — TELEPHONE ENCOUNTER
Patient called saying that she is having back spasms for the past 24 hours. She has been taking ibuprofen 600 mg approximately every 6 hours. We will add orphenadrine 100 mg p.o. twice daily x5 days.   She will call if she is not improved over the next 3 to 4 days

## 2022-03-06 NOTE — TELEPHONE ENCOUNTER
Please call patient. She hasn't had labs since 2019. Please advise her that it is very important that we check labs at least annually to check kidney and liver function and electrolytes for her HTN and cholesterol medications. Ok to fill 30 pills once we remind patient about blood work.

## 2022-03-07 RX ORDER — ATORVASTATIN CALCIUM 10 MG/1
TABLET, FILM COATED ORAL
Qty: 30 TABLET | Refills: 0 | Status: ON HOLD | OUTPATIENT
Start: 2022-03-07

## 2022-03-07 NOTE — TELEPHONE ENCOUNTER
Received call from pt. Informed her that it is important for her to complete labs at least annually to check kidney and liver function and electrolytes for her HTN and cholesterol medications. Since no labs completed since 2019, advised her that [facility] will send for 30 pills at this time. Germán Castro she will try and complete labs on Monday, 3/14/22.     Rx sent for #30

## 2022-03-08 RX ORDER — CYCLOBENZAPRINE HCL 10 MG
TABLET ORAL
Qty: 12 TABLET | Refills: 0 | Status: SHIPPED | OUTPATIENT
Start: 2022-03-08 | End: 2022-03-14 | Stop reason: ALTCHOICE

## 2022-03-08 RX ORDER — METHYLPREDNISOLONE 4 MG/1
TABLET ORAL
Qty: 21 EACH | Refills: 0 | Status: SHIPPED | OUTPATIENT
Start: 2022-03-08 | End: 2022-03-14 | Stop reason: ALTCHOICE

## 2022-03-08 NOTE — TELEPHONE ENCOUNTER
Have her stop the ibuprofen and begin a Medrol 4 mg Dosepak. Have her stop the orphenadrine and begin Flexeril 10 mg p.o. 3 times daily (x3 to 4 days). Please refer her to physical therapy for evaluation.

## 2022-03-08 NOTE — TELEPHONE ENCOUNTER
Record shows 3/6/22 dr Elana Reaves order for orphenadrine 100 mg bid x 5 days-added to pt's current ibuprofen 600 mg every 6 hours for 24 hr hx sudden onset back spasms. Advised at that time to call if not improved over next 3-4 days. Call to pt-sts notices only minimal change since adding orphenadrine. \"spasms sl less excruciating but still rate as 9/10 when they occur. using a walker now. Would like a stronger muscle relaxant. \" denies any other new/changing symptoms. Advised will update dr Elana Reaves and call back w further recommendations. Patient voices understanding/agrees with plan/no further questions. **see above and advise-thanks!

## 2022-03-08 NOTE — TELEPHONE ENCOUNTER
1201 call to pt-advised of dr lo's instructions noted below. Explained rationales, medrol dose pack/pkg dosing instructions and advised to allow pharmacist to show/explain and  her on medrol dose pack. Offered PT scheduling number. Pt declines-sts she will look at referral in her mychart but requests location/contact info be sent in "[employer]t messg-sent. Advised to call our ofc if any further questions/concerns. If any worsening symptoms-numbness, weakness, bowel/bladder difficulty-should be evaluated in ER. Patient voices understanding/agrees with plan/no further questions. Confirms pharmacy. sts lives in [place] but works in [place] look at locations and decide on PT. Med and physical therapy orders placed.

## 2022-03-08 NOTE — TELEPHONE ENCOUNTER
Pt states she is walking with a walker, the spasms have not abated. Pt requesting a stronger muscle relaxant.          [facility] [address] [place] [address],[address], [address] [facility] AT [address] [place] [address], [phone], [phone]

## 2022-03-10 ENCOUNTER — TELEPHONE (OUTPATIENT)
Dept: FAMILY MEDICINE CLINIC | Facility: CLINIC | Age: 67
End: 2022-03-10

## 2022-03-10 NOTE — TELEPHONE ENCOUNTER
Received live call from pt. Informed her that  recommends she try taking half of a tablet of Cyclobenzaprine 10mg-->(5 mg). If similar symptoms persist, she will need to stop medication. Advised her to call with update if symptoms do not resolve after decrease in dose. Pt verbalized understanding and is agreeable to plan. No further questions at this time.

## 2022-03-10 NOTE — TELEPHONE ENCOUNTER
Yesterday morning, t took her dosage of:    cyclobenzaprine 10 MG Oral Tab    Pt sates she was shaking uncontrollably, However, when she drank water, the shaking abated. Pt asking if this is a normal side effect of the medication.

## 2022-03-10 NOTE — TELEPHONE ENCOUNTER
I S/w pt. She started taking Cyclobenzaprine 10 mg on 03/08 at night. She took it yesterday every 8 hours with no problems. She stated \" it has really helped my symptoms. \" this morning she took 1 cyclobenzaprine 10 mg and shortly after she started to shake all over uncontrollably. She started pushing fluids and within about 15 minutes the shaking resolved. She now feels fine. Can she continue to take the medication today or does she need to stop it? Please advise.

## 2022-03-14 ENCOUNTER — OFFICE VISIT (OUTPATIENT)
Dept: FAMILY MEDICINE CLINIC | Facility: CLINIC | Age: 67
End: 2022-03-14
Payer: COMMERCIAL

## 2022-03-14 VITALS
HEIGHT: 62 IN | DIASTOLIC BLOOD PRESSURE: 86 MMHG | HEART RATE: 80 BPM | RESPIRATION RATE: 16 BRPM | SYSTOLIC BLOOD PRESSURE: 132 MMHG | BODY MASS INDEX: 27.6 KG/M2 | TEMPERATURE: 97 F | WEIGHT: 150 LBS

## 2022-03-14 DIAGNOSIS — M62.81 SUBJECTIVE MUSCLE WEAKNESS: ICD-10-CM

## 2022-03-14 DIAGNOSIS — M54.41 ACUTE RIGHT-SIDED LOW BACK PAIN WITH RIGHT-SIDED SCIATICA: Primary | ICD-10-CM

## 2022-03-14 DIAGNOSIS — M79.604 RIGHT LEG PAIN: ICD-10-CM

## 2022-03-14 DIAGNOSIS — M41.9 SCOLIOSIS OF LUMBAR SPINE, UNSPECIFIED SCOLIOSIS TYPE: ICD-10-CM

## 2022-03-14 DIAGNOSIS — M51.36 DEGENERATIVE DISC DISEASE, LUMBAR: ICD-10-CM

## 2022-03-14 PROCEDURE — 3008F BODY MASS INDEX DOCD: CPT | Performed by: FAMILY MEDICINE

## 2022-03-14 PROCEDURE — 3079F DIAST BP 80-89 MM HG: CPT | Performed by: FAMILY MEDICINE

## 2022-03-14 PROCEDURE — 3075F SYST BP GE 130 - 139MM HG: CPT | Performed by: FAMILY MEDICINE

## 2022-03-14 PROCEDURE — 99215 OFFICE O/P EST HI 40 MIN: CPT | Performed by: FAMILY MEDICINE

## 2022-03-14 RX ORDER — CYCLOBENZAPRINE HCL 10 MG
TABLET ORAL
Qty: 30 TABLET | Refills: 1 | Status: ON HOLD | OUTPATIENT
Start: 2022-03-14

## 2022-03-14 RX ORDER — METHYLPREDNISOLONE 4 MG/1
TABLET ORAL
Qty: 1 EACH | Refills: 0 | Status: SHIPPED | OUTPATIENT
Start: 2022-03-14 | End: 2022-03-21 | Stop reason: ALTCHOICE

## 2022-03-17 ENCOUNTER — TELEPHONE (OUTPATIENT)
Dept: FAMILY MEDICINE CLINIC | Facility: CLINIC | Age: 67
End: 2022-03-17

## 2022-03-17 RX ORDER — HYDROCODONE BITARTRATE AND ACETAMINOPHEN 5; 325 MG/1; MG/1
TABLET ORAL
Qty: 20 TABLET | Refills: 0 | Status: SHIPPED | OUTPATIENT
Start: 2022-03-17 | End: 2022-03-21

## 2022-03-17 NOTE — TELEPHONE ENCOUNTER
Pt called and stated that she wants a prescription for hydro-codeine(norco). She also stated that she needs a letter to return to work. Pt is requesting a pain specialist closer to her home. She lives in [address] [place] [address] and she prefers [place].     Please advise

## 2022-03-17 NOTE — TELEPHONE ENCOUNTER
In case pt is wanting to pay out of pocket:  Per Chrissy:  HYDROcodone-acetaminophen (NORCO) 5-325 MG Oral Tab 20 tablet   Is estimated to be $11.39 at [facility] referral dept: [phone]    Call to pt's cell. Reaches identified VM. Per HIPAA consent, LVM requesting call back to triage nurse today. Provided call back number and ofc phone hours.

## 2022-03-17 NOTE — TELEPHONE ENCOUNTER
Pt informed of below and she states she will go ahead and use [facility] for her Norco.  In regard to another Pain MD, she states she will just stay with her current one.

## 2022-03-17 NOTE — TELEPHONE ENCOUNTER
I sent the medication but looks like it is going to need a prior authorization. I do not know any of the pain specialists at [facility] but she could call the physician referral line there for a recommendation.

## 2022-03-17 NOTE — TELEPHONE ENCOUNTER
Sts Brock PULLIAM discussed prescription for Norco at time of OV on 3/14/22, but pt declined at this time. Now would like this prescription to be sent to:  [place] [address] [address] [facility],[address], [address] [facility] AT [address] [place] [address], [phone], [phone]    Also discussed RTW note, but sts she will call to discuss this after her OV with pain medicine on 3/21/22    Asking for recommendations to see a pain specialist closer to her home ([place]). She says specialist in [facility] would also be appropriate. Please advise, thank you.

## 2022-03-21 ENCOUNTER — TELEPHONE (OUTPATIENT)
Dept: FAMILY MEDICINE CLINIC | Facility: CLINIC | Age: 67
End: 2022-03-21

## 2022-03-21 ENCOUNTER — OFFICE VISIT (OUTPATIENT)
Dept: PAIN CLINIC | Facility: CLINIC | Age: 67
End: 2022-03-21
Payer: COMMERCIAL

## 2022-03-21 VITALS
HEART RATE: 129 BPM | OXYGEN SATURATION: 98 % | SYSTOLIC BLOOD PRESSURE: 128 MMHG | WEIGHT: 150 LBS | BODY MASS INDEX: 27 KG/M2 | DIASTOLIC BLOOD PRESSURE: 80 MMHG

## 2022-03-21 DIAGNOSIS — S39.012D STRAIN OF LUMBAR REGION, SUBSEQUENT ENCOUNTER: ICD-10-CM

## 2022-03-21 DIAGNOSIS — M25.512 ACUTE PAIN OF BOTH SHOULDERS: Primary | ICD-10-CM

## 2022-03-21 DIAGNOSIS — M25.511 ACUTE PAIN OF BOTH SHOULDERS: Primary | ICD-10-CM

## 2022-03-21 DIAGNOSIS — M54.50 ACUTE RIGHT-SIDED LOW BACK PAIN WITHOUT SCIATICA: ICD-10-CM

## 2022-03-21 PROCEDURE — 96372 THER/PROPH/DIAG INJ SC/IM: CPT | Performed by: ANESTHESIOLOGY

## 2022-03-21 PROCEDURE — 99204 OFFICE O/P NEW MOD 45 MIN: CPT | Performed by: ANESTHESIOLOGY

## 2022-03-21 PROCEDURE — 3074F SYST BP LT 130 MM HG: CPT | Performed by: ANESTHESIOLOGY

## 2022-03-21 PROCEDURE — 3079F DIAST BP 80-89 MM HG: CPT | Performed by: ANESTHESIOLOGY

## 2022-03-21 RX ORDER — KETOROLAC TROMETHAMINE 30 MG/ML
30 INJECTION, SOLUTION INTRAMUSCULAR; INTRAVENOUS ONCE
Status: COMPLETED | OUTPATIENT
Start: 2022-03-21 | End: 2022-03-21

## 2022-03-21 RX ORDER — HYDROCODONE BITARTRATE AND ACETAMINOPHEN 5; 325 MG/1; MG/1
TABLET ORAL
Qty: 20 TABLET | Refills: 0 | Status: ON HOLD | OUTPATIENT
Start: 2022-03-21

## 2022-03-21 NOTE — TELEPHONE ENCOUNTER
Call to pt's cell. Reaches identified VM. Per HIPAA consent, LVM requesting call back to triage nurse today for instructions. Provided call back number and ofc phone hours.

## 2022-03-21 NOTE — TELEPHONE ENCOUNTER
Pt calling states she cannot get into see Dr Sole Hobson until mid April. First time visit requires to see a doctor not a PA. Pt questioning if she should wait until then or see someone else? Please advise.

## 2022-03-22 ENCOUNTER — TELEPHONE (OUTPATIENT)
Dept: NEUROLOGY | Facility: CLINIC | Age: 67
End: 2022-03-22

## 2022-03-22 NOTE — TELEPHONE ENCOUNTER
Call to pt's cell reaches unidentified voice mail. Left vmm advising returning her call from yesterday, will send [employer] message w info from [employer] PULLIAM.  [employer] message sent-hold for read.

## 2022-03-22 NOTE — TELEPHONE ENCOUNTER
Prior authorization request completed for: Lumbar TPI    Authorization # NO PRIOR AUTHORIZATION REQUIRED  Authorization dates: N/A  CPT codes approved: 67189 / 01048  Number of visits/dates of service approved: 1  Physician: Dr. FERRER [facility]   Location: Office    OK to schedule      Transaction ID: [id number] ID: [id number] Date: 2022-03-22

## 2022-03-23 ENCOUNTER — TELEPHONE (OUTPATIENT)
Dept: PAIN CLINIC | Facility: CLINIC | Age: 67
End: 2022-03-23

## 2022-03-23 NOTE — TELEPHONE ENCOUNTER
Pt stated that she is not taking the Diclofenac for a few reasons: she didn't know she was being prescribed it/could not recall it being discussed at the last OV on 3-21-22, additionally she read the side affects on the bottle and doesn't feel comfortable taking the medication. Told the pt I would let Dr. Alana Caruso know, and see if there are any alternatives he can suggest. Pt VU.

## 2022-03-23 NOTE — TELEPHONE ENCOUNTER
See both messages/questions from patient. Have her hold the muscle relaxant for a day to see if the urinary symptoms resolve - if not, see in office. I sent the letter through her My Chart and she can email to the person at her work since we are unable to email for her.

## 2022-03-23 NOTE — TELEPHONE ENCOUNTER
Question Answer   Anesthesia Type Local   Provider [facility]   Medical clearance requested (will send to Pain Navigator) No   Patient has Medicare coverage?  No   Comments (Please list entire procedure name here.) lumbar myofascial trigger points

## 2022-03-24 ENCOUNTER — TELEPHONE (OUTPATIENT)
Dept: FAMILY MEDICINE CLINIC | Facility: CLINIC | Age: 67
End: 2022-03-24

## 2022-03-24 NOTE — TELEPHONE ENCOUNTER
Noted. Seems it was likely the muscle relaxant pill coating causing her urine discoloration. As long as otherwise tolerating muscle relaxant well, can restart if she needs it.

## 2022-03-28 NOTE — TELEPHONE ENCOUNTER
Pt calling to confirm meds to hold prior to injections:   Pt to hold ASA 81 and Ibuprofen for 24 hours prior to the procedure    PT VU. No further needs.

## 2022-03-30 ENCOUNTER — HOSPITAL ENCOUNTER (INPATIENT)
Facility: HOSPITAL | Age: 67
LOS: 7 days | Discharge: HOME HEALTH | End: 2022-04-06
Attending: INTERNAL MEDICINE | Admitting: HOSPITALIST
Payer: COMMERCIAL

## 2022-03-30 ENCOUNTER — OFFICE VISIT (OUTPATIENT)
Dept: PAIN CLINIC | Facility: CLINIC | Age: 67
End: 2022-03-30
Payer: COMMERCIAL

## 2022-03-30 ENCOUNTER — OFFICE VISIT (OUTPATIENT)
Dept: SURGERY | Facility: CLINIC | Age: 67
End: 2022-03-30
Payer: COMMERCIAL

## 2022-03-30 VITALS — SYSTOLIC BLOOD PRESSURE: 140 MMHG | DIASTOLIC BLOOD PRESSURE: 90 MMHG | OXYGEN SATURATION: 98 % | HEART RATE: 131 BPM

## 2022-03-30 VITALS
DIASTOLIC BLOOD PRESSURE: 80 MMHG | BODY MASS INDEX: 26.31 KG/M2 | HEIGHT: 62 IN | WEIGHT: 143 LBS | HEART RATE: 90 BPM | SYSTOLIC BLOOD PRESSURE: 140 MMHG | TEMPERATURE: 100 F

## 2022-03-30 DIAGNOSIS — G06.1 ABSCESS IN EPIDURAL SPACE OF LUMBAR SPINE: ICD-10-CM

## 2022-03-30 DIAGNOSIS — M54.50 LUMBAR PAIN: ICD-10-CM

## 2022-03-30 DIAGNOSIS — S39.012D STRAIN OF LUMBAR REGION, SUBSEQUENT ENCOUNTER: ICD-10-CM

## 2022-03-30 DIAGNOSIS — R93.7 ABNORMAL MRI, LUMBAR SPINE: ICD-10-CM

## 2022-03-30 DIAGNOSIS — R50.9 LOW GRADE FEVER: ICD-10-CM

## 2022-03-30 DIAGNOSIS — M86.9 OSTEOMYELITIS OF OTHER SITE, UNSPECIFIED TYPE (HCC): Primary | ICD-10-CM

## 2022-03-30 DIAGNOSIS — M54.50 ACUTE RIGHT-SIDED LOW BACK PAIN WITHOUT SCIATICA: Primary | ICD-10-CM

## 2022-03-30 DIAGNOSIS — M54.16 LUMBAR RADICULOPATHY: ICD-10-CM

## 2022-03-30 PROBLEM — M46.46 DISCITIS OF LUMBAR REGION: Status: ACTIVE | Noted: 2022-03-30

## 2022-03-30 LAB
ALBUMIN SERPL-MCNC: 1.9 G/DL (ref 3.4–5)
ALBUMIN/GLOB SERPL: 0.3 {RATIO} (ref 1–2)
ALP LIVER SERPL-CCNC: 147 U/L
ALT SERPL-CCNC: 36 U/L
ANION GAP SERPL CALC-SCNC: 7 MMOL/L (ref 0–18)
AST SERPL-CCNC: 23 U/L (ref 15–37)
BASOPHILS # BLD: 0 X10(3) UL (ref 0–0.2)
BASOPHILS NFR BLD: 0 %
BILIRUB SERPL-MCNC: 0.5 MG/DL (ref 0.1–2)
BUN BLD-MCNC: 10 MG/DL (ref 7–18)
CALCIUM BLD-MCNC: 9.4 MG/DL (ref 8.5–10.1)
CHLORIDE SERPL-SCNC: 97 MMOL/L (ref 98–112)
CO2 SERPL-SCNC: 27 MMOL/L (ref 21–32)
CREAT BLD-MCNC: 0.51 MG/DL
CRP SERPL-MCNC: 24.4 MG/DL (ref ?–0.3)
EOSINOPHIL # BLD: 0 X10(3) UL (ref 0–0.7)
EOSINOPHIL NFR BLD: 0 %
ERYTHROCYTE [DISTWIDTH] IN BLOOD BY AUTOMATED COUNT: 14.4 %
ERYTHROCYTE [SEDIMENTATION RATE] IN BLOOD: 128 MM/HR
GLOBULIN PLAS-MCNC: 5.6 G/DL (ref 2.8–4.4)
GLUCOSE BLD-MCNC: 103 MG/DL (ref 70–99)
HCT VFR BLD AUTO: 27 %
HGB BLD-MCNC: 8.5 G/DL
INR BLD: 1.35 (ref 0.8–1.2)
LYMPHOCYTES NFR BLD: 0.51 X10(3) UL (ref 1–4)
LYMPHOCYTES NFR BLD: 4 %
MCH RBC QN AUTO: 28.5 PG (ref 26–34)
MCHC RBC AUTO-ENTMCNC: 31.5 G/DL (ref 31–37)
MCV RBC AUTO: 90.6 FL
MONOCYTES # BLD: 0.64 X10(3) UL (ref 0.1–1)
MONOCYTES NFR BLD: 5 %
MORPHOLOGY: NORMAL
NEUTROPHILS # BLD AUTO: 10.81 X10 (3) UL (ref 1.5–7.7)
NEUTROPHILS NFR BLD: 89 %
NEUTS BAND NFR BLD: 2 %
NEUTS HYPERSEG # BLD: 11.65 X10(3) UL (ref 1.5–7.7)
OSMOLALITY SERPL CALC.SUM OF ELEC: 271 MOSM/KG (ref 275–295)
PLATELET # BLD AUTO: 425 10(3)UL (ref 150–450)
PLATELET MORPHOLOGY: NORMAL
POTASSIUM SERPL-SCNC: 4.3 MMOL/L (ref 3.5–5.1)
PROT SERPL-MCNC: 7.5 G/DL (ref 6.4–8.2)
PROTHROMBIN TIME: 16.7 SECONDS (ref 11.6–14.8)
RBC # BLD AUTO: 2.98 X10(6)UL
SARS-COV-2 RNA RESP QL NAA+PROBE: NOT DETECTED
SODIUM SERPL-SCNC: 131 MMOL/L (ref 136–145)
TOTAL CELLS COUNTED BLD: 100
WBC # BLD AUTO: 12.8 X10(3) UL (ref 4–11)

## 2022-03-30 PROCEDURE — 3080F DIAST BP >= 90 MM HG: CPT | Performed by: ANESTHESIOLOGY

## 2022-03-30 PROCEDURE — 99214 OFFICE O/P EST MOD 30 MIN: CPT | Performed by: PHYSICIAN ASSISTANT

## 2022-03-30 PROCEDURE — 84145 PROCALCITONIN (PCT): CPT | Performed by: INTERNAL MEDICINE

## 2022-03-30 PROCEDURE — 3077F SYST BP >= 140 MM HG: CPT | Performed by: ANESTHESIOLOGY

## 2022-03-30 PROCEDURE — 3077F SYST BP >= 140 MM HG: CPT | Performed by: PHYSICIAN ASSISTANT

## 2022-03-30 PROCEDURE — 85025 COMPLETE CBC W/AUTO DIFF WBC: CPT | Performed by: INTERNAL MEDICINE

## 2022-03-30 PROCEDURE — 3008F BODY MASS INDEX DOCD: CPT | Performed by: PHYSICIAN ASSISTANT

## 2022-03-30 PROCEDURE — 3079F DIAST BP 80-89 MM HG: CPT | Performed by: PHYSICIAN ASSISTANT

## 2022-03-30 PROCEDURE — 85007 BL SMEAR W/DIFF WBC COUNT: CPT | Performed by: INTERNAL MEDICINE

## 2022-03-30 PROCEDURE — 80053 COMPREHEN METABOLIC PANEL: CPT | Performed by: INTERNAL MEDICINE

## 2022-03-30 PROCEDURE — 85652 RBC SED RATE AUTOMATED: CPT | Performed by: INTERNAL MEDICINE

## 2022-03-30 PROCEDURE — 85610 PROTHROMBIN TIME: CPT | Performed by: INTERNAL MEDICINE

## 2022-03-30 PROCEDURE — 20553 NJX 1/MLT TRIGGER POINTS 3/>: CPT | Performed by: ANESTHESIOLOGY

## 2022-03-30 PROCEDURE — 85027 COMPLETE CBC AUTOMATED: CPT | Performed by: INTERNAL MEDICINE

## 2022-03-30 PROCEDURE — 86140 C-REACTIVE PROTEIN: CPT | Performed by: INTERNAL MEDICINE

## 2022-03-30 PROCEDURE — S0020 INJECTION, BUPIVICAINE HYDRO: HCPCS | Performed by: ANESTHESIOLOGY

## 2022-03-30 RX ORDER — ATORVASTATIN CALCIUM 10 MG/1
10 TABLET, FILM COATED ORAL EVERY EVENING
Status: DISCONTINUED | OUTPATIENT
Start: 2022-03-30 | End: 2022-04-06

## 2022-03-30 RX ORDER — MORPHINE SULFATE 2 MG/ML
0.5 INJECTION, SOLUTION INTRAMUSCULAR; INTRAVENOUS EVERY 2 HOUR PRN
Status: DISCONTINUED | OUTPATIENT
Start: 2022-03-30 | End: 2022-04-06

## 2022-03-30 RX ORDER — TRIAMCINOLONE ACETONIDE 40 MG/ML
80 INJECTION, SUSPENSION INTRA-ARTICULAR; INTRAMUSCULAR ONCE
Status: COMPLETED | OUTPATIENT
Start: 2022-03-30 | End: 2022-03-30

## 2022-03-30 RX ORDER — CYCLOBENZAPRINE HCL 10 MG
10 TABLET ORAL 3 TIMES DAILY PRN
Status: DISCONTINUED | OUTPATIENT
Start: 2022-03-30 | End: 2022-04-06

## 2022-03-30 RX ORDER — MELATONIN
3 NIGHTLY PRN
Status: DISCONTINUED | OUTPATIENT
Start: 2022-03-30 | End: 2022-04-06

## 2022-03-30 RX ORDER — BUPIVACAINE HYDROCHLORIDE 5 MG/ML
18 INJECTION, SOLUTION EPIDURAL; INTRACAUDAL ONCE
Status: COMPLETED | OUTPATIENT
Start: 2022-03-30 | End: 2022-03-30

## 2022-03-30 RX ORDER — POLYETHYLENE GLYCOL 3350 17 G/17G
17 POWDER, FOR SOLUTION ORAL DAILY PRN
Status: DISCONTINUED | OUTPATIENT
Start: 2022-03-30 | End: 2022-04-06

## 2022-03-30 RX ORDER — MORPHINE SULFATE 2 MG/ML
2 INJECTION, SOLUTION INTRAMUSCULAR; INTRAVENOUS EVERY 2 HOUR PRN
Status: DISCONTINUED | OUTPATIENT
Start: 2022-03-30 | End: 2022-04-06

## 2022-03-30 RX ORDER — GABAPENTIN 100 MG/1
100 CAPSULE ORAL NIGHTLY
Status: DISCONTINUED | OUTPATIENT
Start: 2022-03-30 | End: 2022-04-06

## 2022-03-30 RX ORDER — HYDROCODONE BITARTRATE AND ACETAMINOPHEN 5; 325 MG/1; MG/1
1 TABLET ORAL EVERY 6 HOURS PRN
Status: DISCONTINUED | OUTPATIENT
Start: 2022-03-30 | End: 2022-04-04

## 2022-03-30 RX ORDER — ONDANSETRON 2 MG/ML
4 INJECTION INTRAMUSCULAR; INTRAVENOUS EVERY 6 HOURS PRN
Status: DISCONTINUED | OUTPATIENT
Start: 2022-03-30 | End: 2022-04-06

## 2022-03-30 RX ORDER — ACETAMINOPHEN 325 MG/1
650 TABLET ORAL EVERY 6 HOURS PRN
Status: DISCONTINUED | OUTPATIENT
Start: 2022-03-30 | End: 2022-04-06

## 2022-03-30 RX ORDER — MORPHINE SULFATE 2 MG/ML
1 INJECTION, SOLUTION INTRAMUSCULAR; INTRAVENOUS EVERY 2 HOUR PRN
Status: DISCONTINUED | OUTPATIENT
Start: 2022-03-30 | End: 2022-04-06

## 2022-03-30 RX ORDER — BISACODYL 10 MG
10 SUPPOSITORY, RECTAL RECTAL
Status: DISCONTINUED | OUTPATIENT
Start: 2022-03-30 | End: 2022-04-06

## 2022-03-30 RX ORDER — LISINOPRIL 20 MG/1
20 TABLET ORAL EVERY EVENING
Status: DISCONTINUED | OUTPATIENT
Start: 2022-03-30 | End: 2022-04-06

## 2022-03-30 RX ORDER — NICOTINE 21 MG/24HR
1 PATCH, TRANSDERMAL 24 HOURS TRANSDERMAL DAILY
Status: DISCONTINUED | OUTPATIENT
Start: 2022-03-30 | End: 2022-04-06

## 2022-03-30 RX ORDER — PROCHLORPERAZINE EDISYLATE 5 MG/ML
5 INJECTION INTRAMUSCULAR; INTRAVENOUS EVERY 8 HOURS PRN
Status: DISCONTINUED | OUTPATIENT
Start: 2022-03-30 | End: 2022-04-06

## 2022-03-30 RX ORDER — SENNOSIDES 8.6 MG
17.2 TABLET ORAL NIGHTLY PRN
Status: DISCONTINUED | OUTPATIENT
Start: 2022-03-30 | End: 2022-04-06

## 2022-03-30 RX ORDER — SODIUM CHLORIDE 9 MG/ML
INJECTION, SOLUTION INTRAVENOUS CONTINUOUS
Status: DISCONTINUED | OUTPATIENT
Start: 2022-03-30 | End: 2022-04-06

## 2022-03-30 RX ORDER — HYDRALAZINE HYDROCHLORIDE 20 MG/ML
10 INJECTION INTRAMUSCULAR; INTRAVENOUS EVERY 6 HOURS PRN
Status: DISCONTINUED | OUTPATIENT
Start: 2022-03-30 | End: 2022-04-06

## 2022-03-30 RX ORDER — SODIUM PHOSPHATE, DIBASIC AND SODIUM PHOSPHATE, MONOBASIC 7; 19 G/133ML; G/133ML
1 ENEMA RECTAL ONCE AS NEEDED
Status: DISCONTINUED | OUTPATIENT
Start: 2022-03-30 | End: 2022-04-06

## 2022-03-30 NOTE — PROGRESS NOTES
Patient presents in office today with reported pain in low back     Current pain level reported = 9/10    Last reported dose of norco this am       Narcotic Contract renewal na    Urine Drug screen na

## 2022-03-30 NOTE — PROGRESS NOTES
ID on consult, Dr. Drea Salmeron paged but answering service informed RN that Dr. Kell Abdul is on call. Dr. Kell Abdul paged, waiting for call back.

## 2022-03-30 NOTE — PROGRESS NOTES
Timeout completed prior to procedure @ 5702. Participants present for timeout:  Dr. Batool Vasquez, pt's spouse, and patient.

## 2022-03-30 NOTE — PROGRESS NOTES
03/30/22 1710   BiPAP   $ RT Standby Charge (per 15 min) 1   BiPAP/CPAP Monitored Parameters   Toleration Refused   Patient does not wear cpap at home.

## 2022-03-30 NOTE — PROGRESS NOTES
Having right side lower back pain, has a little numbness in her right leg. And has some sharp, stabbing pain     States she had some trigger point injection with Dr. Kayla Vargas today.          Pasted PT - states it helped her   Pasted injection- states they helped her   No back surgeries

## 2022-03-30 NOTE — PLAN OF CARE
Problem: PAIN - ADULT  Goal: Verbalizes/displays adequate comfort level or patient's stated pain goal  Description: INTERVENTIONS:  - Encourage pt to monitor pain and request assistance  - Assess pain using appropriate pain scale  - Administer analgesics based on type and severity of pain and evaluate response  - Implement non-pharmacological measures as appropriate and evaluate response  - Consider cultural and social influences on pain and pain management  - Manage/alleviate anxiety  - Utilize distraction and/or relaxation techniques  - Monitor for opioid side effects  - Notify MD/LIP if interventions unsuccessful or patient reports new pain  - Anticipate increased pain with activity and pre-medicate as appropriate  Outcome: Progressing     Problem: SAFETY ADULT - FALL  Goal: Free from fall injury  Description: INTERVENTIONS:  - Assess pt frequently for physical needs  - Identify cognitive and physical deficits and behaviors that affect risk of falls.   - [place] fall precautions as indicated by assessment.  - Educate pt/family on patient safety including physical limitations  - Instruct pt to call for assistance with activity based on assessment  - Modify environment to reduce risk of injury  - Provide assistive devices as appropriate  - Consider OT/PT consult to assist with strengthening/mobility  - Encourage toileting schedule  Outcome: Progressing

## 2022-03-30 NOTE — PROCEDURES
Date of procedure: March 30, 2022    Preop diagnosis: Lumbar scoliosis, myofascial pain    Postop diagnosis: Same    Procedure: Bilateral lumbar trigger point injections    Surgeon: Leonila Allen    Anesthesia: None    EBL: 0    Indication: The patient is a 56-year-old female with history of scoliosis and muscle spasm    Procedure detail: Informed consent was obtained. Timeout was done. The skin overlying the lumbar spine was prepped in the usual sterile fashion. A 27-gauge needle was then used to deliver combination of 80 mg of triamcinolone with 18 cc of 0.5% bupivacaine. Muscles injected include the quadratus lumborum, gluteus roland, and gluteus medius. The needle was then withdrawn with tip intact. The patient tolerated procedure well.     Leonila Allen MD

## 2022-03-30 NOTE — PROGRESS NOTES
Patient refused Nicoderm patch at this time. She will ask for the Nicoderm patch if she needs it. Patient placed on OSR protocol. Telemetry and continous pulse oximeter monitoring on.

## 2022-03-30 NOTE — PROGRESS NOTES
NURSING ADMISSION NOTE      Patient admitted via Wheelchair from neuro surgeon's office. Oriented to room. Patient is A/O x 4, accompanied by her spouse. Safety precautions initiated. Made comfortable in the room. Bed in low position. Dr. Odessia Burkitt saw patient admission orders made. Call light in reach. Reinforced use of call light. Vital signs taken and body system assessment done.

## 2022-03-31 ENCOUNTER — APPOINTMENT (OUTPATIENT)
Dept: CT IMAGING | Facility: HOSPITAL | Age: 67
End: 2022-03-31
Attending: PHYSICIAN ASSISTANT
Payer: COMMERCIAL

## 2022-03-31 ENCOUNTER — TELEPHONE (OUTPATIENT)
Dept: NEUROLOGY | Facility: CLINIC | Age: 67
End: 2022-03-31

## 2022-03-31 LAB — PROCALCITONIN SERPL-MCNC: 0.13 NG/ML (ref ?–0.16)

## 2022-03-31 PROCEDURE — 87186 SC STD MICRODIL/AGAR DIL: CPT | Performed by: PHYSICIAN ASSISTANT

## 2022-03-31 PROCEDURE — 87070 CULTURE OTHR SPECIMN AEROBIC: CPT | Performed by: PHYSICIAN ASSISTANT

## 2022-03-31 PROCEDURE — 87102 FUNGUS ISOLATION CULTURE: CPT | Performed by: PHYSICIAN ASSISTANT

## 2022-03-31 PROCEDURE — 87206 SMEAR FLUORESCENT/ACID STAI: CPT | Performed by: PHYSICIAN ASSISTANT

## 2022-03-31 PROCEDURE — 97165 OT EVAL LOW COMPLEX 30 MIN: CPT

## 2022-03-31 PROCEDURE — 62267 INTERDISCAL PERQ ASPIR DX: CPT | Performed by: PHYSICIAN ASSISTANT

## 2022-03-31 PROCEDURE — 87116 MYCOBACTERIA CULTURE: CPT | Performed by: PHYSICIAN ASSISTANT

## 2022-03-31 PROCEDURE — 97161 PT EVAL LOW COMPLEX 20 MIN: CPT

## 2022-03-31 PROCEDURE — 87150 DNA/RNA AMPLIFIED PROBE: CPT | Performed by: PHYSICIAN ASSISTANT

## 2022-03-31 PROCEDURE — 87205 SMEAR GRAM STAIN: CPT | Performed by: PHYSICIAN ASSISTANT

## 2022-03-31 PROCEDURE — 97530 THERAPEUTIC ACTIVITIES: CPT

## 2022-03-31 PROCEDURE — 97535 SELF CARE MNGMENT TRAINING: CPT

## 2022-03-31 PROCEDURE — 99152 MOD SED SAME PHYS/QHP 5/>YRS: CPT | Performed by: PHYSICIAN ASSISTANT

## 2022-03-31 PROCEDURE — 87077 CULTURE AEROBIC IDENTIFY: CPT | Performed by: PHYSICIAN ASSISTANT

## 2022-03-31 PROCEDURE — 0R9B3ZX DRAINAGE OF THORACOLUMBAR VERTEBRAL DISC, PERCUTANEOUS APPROACH, DIAGNOSTIC: ICD-10-PCS | Performed by: RADIOLOGY

## 2022-03-31 PROCEDURE — 87075 CULTR BACTERIA EXCEPT BLOOD: CPT | Performed by: PHYSICIAN ASSISTANT

## 2022-03-31 PROCEDURE — 77012 CT SCAN FOR NEEDLE BIOPSY: CPT | Performed by: PHYSICIAN ASSISTANT

## 2022-03-31 PROCEDURE — 87040 BLOOD CULTURE FOR BACTERIA: CPT | Performed by: PHYSICIAN ASSISTANT

## 2022-03-31 RX ORDER — METOPROLOL TARTRATE 5 MG/5ML
10 INJECTION INTRAVENOUS EVERY 6 HOURS
Status: DISCONTINUED | OUTPATIENT
Start: 2022-03-31 | End: 2022-04-04

## 2022-03-31 RX ORDER — SODIUM CHLORIDE 9 MG/ML
INJECTION, SOLUTION INTRAVENOUS CONTINUOUS
Status: DISCONTINUED | OUTPATIENT
Start: 2022-03-31 | End: 2022-03-31 | Stop reason: HOSPADM

## 2022-03-31 RX ORDER — MIDAZOLAM HYDROCHLORIDE 1 MG/ML
INJECTION INTRAMUSCULAR; INTRAVENOUS
Status: COMPLETED
Start: 2022-03-31 | End: 2022-03-31

## 2022-03-31 RX ORDER — FLUMAZENIL 0.1 MG/ML
0.2 INJECTION, SOLUTION INTRAVENOUS AS NEEDED
Status: DISCONTINUED | OUTPATIENT
Start: 2022-03-31 | End: 2022-03-31 | Stop reason: HOSPADM

## 2022-03-31 RX ORDER — NALOXONE HYDROCHLORIDE 0.4 MG/ML
80 INJECTION, SOLUTION INTRAMUSCULAR; INTRAVENOUS; SUBCUTANEOUS AS NEEDED
Status: DISCONTINUED | OUTPATIENT
Start: 2022-03-31 | End: 2022-03-31 | Stop reason: HOSPADM

## 2022-03-31 RX ORDER — MIDAZOLAM HYDROCHLORIDE 1 MG/ML
1 INJECTION INTRAMUSCULAR; INTRAVENOUS EVERY 5 MIN PRN
Status: DISCONTINUED | OUTPATIENT
Start: 2022-03-31 | End: 2022-03-31 | Stop reason: HOSPADM

## 2022-03-31 NOTE — PLAN OF CARE
Problem: PAIN - ADULT  Goal: Verbalizes/displays adequate comfort level or patient's stated pain goal  Description: INTERVENTIONS:  - Encourage pt to monitor pain and request assistance  - Assess pain using appropriate pain scale  - Administer analgesics based on type and severity of pain and evaluate response  - Implement non-pharmacological measures as appropriate and evaluate response  - Consider cultural and social influences on pain and pain management  - Manage/alleviate anxiety  - Utilize distraction and/or relaxation techniques  - Monitor for opioid side effects  - Notify MD/LIP if interventions unsuccessful or patient reports new pain  - Anticipate increased pain with activity and pre-medicate as appropriate  Outcome: Progressing     Problem: SAFETY ADULT - FALL  Goal: Free from fall injury  Description: INTERVENTIONS:  - Assess pt frequently for physical needs  - Identify cognitive and physical deficits and behaviors that affect risk of falls. - [place] fall precautions as indicated by assessment.  - Educate pt/family on patient safety including physical limitations  - Instruct pt to call for assistance with activity based on assessment  - Modify environment to reduce risk of injury  - Provide assistive devices as appropriate  - Consider OT/PT consult to assist with strengthening/mobility  - Encourage toileting schedule  Outcome: Progressing   A/O x 4, awake this morning during rounds. Reporting having right lower back pain at 5 level. She appeared to be anxious, both hands are shaking, she reported her hands start to shake 2 weeks ago. Informed patient that Morphine IV will be given to her for pain since she is NPO. She is NPO, scheduled for CT Guided biopsy/aspiration of T11-L1  at 1500. IN fluids infusing for hydration. Will continue to monitor patient.

## 2022-03-31 NOTE — PROCEDURES
[facility]  Procedure Note    Collryder Pretty Patient Status:  Inpatient    10/27/1955 MRN [id number]   [facility] 3SW-A Attending Leah Nova MD   Hosp Day # 1 PCP Ivette Limon MD     Procedure: CT guided aspiration T12-L1 disc space    Pre-Procedure Diagnosis:  Discitis    Post-Procedure Diagnosis: Same    Anesthesia:  Local and Sedation    Findings:  20g spinal needle to T12-L1 space, 1 ml fluid obtained    Specimens: As above    Blood Loss:  Minimal    Tourniquet Time: None  Complications:  None  Drains:  None    Secondary Diagnosis:  None    Anya King MD  3/31/2022

## 2022-03-31 NOTE — PROGRESS NOTES
Patient received awake and alert post  CT guided aspiration T12-L1 disc space. Small tegaderm dressing to lower back, looks clean, dry and intact. Complaint of pain to right lower back due to lying on her stomach during the procedure. Oral pain medication and muscle relaxant given. Patient with elevated heart rate up to 130 bpm with mobility, Metoprolol 10 mg IV given.

## 2022-03-31 NOTE — PLAN OF CARE
Alert and oriented,afebrile,B/P slightly elevated,PRN medication given as ordered. Will monitor. Denies numbness or tingling sensation to both lower extremities,just complains of low back pain. Meds given as ordered for pain with relief. Has been NPO since midnight,for CT GUIDED BIOPSY/ASPIRATION  T11-L1 today. Voids in the bathroom,gets up with her own cane and one assist.Will continue to monitor. 3/31 @ 0500--- Remained SR-ST on tele,-120'S especially when up in the bathroom. Denies chest pain or discomfort. No SOB. Will continue to monitor.

## 2022-03-31 NOTE — IMAGING NOTE
Nichol Robbins, name, date of birth and allergies verified with pt. Pt here for CT biospy intravertebral disc biopsy - T12-L1. States NPO since midnight last night. Pre-procedure labs within normal limits. Pre procedure vitals checked. IV access maintained to left hand. saline lock. 0.9 NS IV initiated to maintain patency. Informed consent obtained by Dr Germán Solorio. Positioned pt prone with arms overhead on scanner. Universal protocol performed. Site prepped, draped and local anesthetic given. Obtained biopsy. Specimen sent to Pathology. Dressing to left lower back clean, dry and intact. Presently denies pain. Tolerated procedure well. Vital signs stable on room air. Report given to YAEL Evans, inpatient nurse. Transported pt to  360 accompanied by Gladys and transporter.

## 2022-03-31 NOTE — IMAGING NOTE
Spoke with Gen CONLEY at bedside after Dr Jossie Mendez reviewed imaging. Plan for 1500 procedure in CT. NPO, pt able to consent and labs good from 3/30 afternoon. RN to saline lock IV for trip to CT.

## 2022-03-31 NOTE — TELEPHONE ENCOUNTER
Dr Sylvia Celeste returning [facility]'s call this morning; pls call Dr Sylvia Celeste at [phone]

## 2022-04-01 ENCOUNTER — APPOINTMENT (OUTPATIENT)
Dept: MRI IMAGING | Facility: HOSPITAL | Age: 67
End: 2022-04-01
Attending: NURSE PRACTITIONER
Payer: COMMERCIAL

## 2022-04-01 ENCOUNTER — APPOINTMENT (OUTPATIENT)
Dept: CV DIAGNOSTICS | Facility: HOSPITAL | Age: 67
End: 2022-04-01
Attending: PHYSICIAN ASSISTANT
Payer: COMMERCIAL

## 2022-04-01 ENCOUNTER — MED REC SCAN ONLY (OUTPATIENT)
Dept: FAMILY MEDICINE CLINIC | Facility: CLINIC | Age: 67
End: 2022-04-01

## 2022-04-01 LAB
BASOPHILS # BLD AUTO: 0.03 X10(3) UL (ref 0–0.2)
BASOPHILS NFR BLD AUTO: 0.2 %
EOSINOPHIL # BLD AUTO: 0 X10(3) UL (ref 0–0.7)
EOSINOPHIL NFR BLD AUTO: 0 %
ERYTHROCYTE [DISTWIDTH] IN BLOOD BY AUTOMATED COUNT: 14.5 %
HCT VFR BLD AUTO: 29.4 %
HGB BLD-MCNC: 9.2 G/DL
IMM GRANULOCYTES # BLD AUTO: 0.43 X10(3) UL (ref 0–1)
IMM GRANULOCYTES NFR BLD: 2.8 %
LACTATE SERPL-SCNC: 0.6 MMOL/L (ref 0.4–2)
LYMPHOCYTES # BLD AUTO: 0.69 X10(3) UL (ref 1–4)
LYMPHOCYTES NFR BLD AUTO: 4.5 %
MCH RBC QN AUTO: 28.6 PG (ref 26–34)
MCHC RBC AUTO-ENTMCNC: 31.3 G/DL (ref 31–37)
MCV RBC AUTO: 91.3 FL
MONOCYTES # BLD AUTO: 0.87 X10(3) UL (ref 0.1–1)
MONOCYTES NFR BLD AUTO: 5.7 %
NEUTROPHILS # BLD AUTO: 13.32 X10 (3) UL (ref 1.5–7.7)
NEUTROPHILS # BLD AUTO: 13.32 X10(3) UL (ref 1.5–7.7)
NEUTROPHILS NFR BLD AUTO: 86.8 %
PLATELET # BLD AUTO: 507 10(3)UL (ref 150–450)
RBC # BLD AUTO: 3.22 X10(6)UL
WBC # BLD AUTO: 15.3 X10(3) UL (ref 4–11)

## 2022-04-01 PROCEDURE — 83605 ASSAY OF LACTIC ACID: CPT | Performed by: HOSPITALIST

## 2022-04-01 PROCEDURE — 87040 BLOOD CULTURE FOR BACTERIA: CPT | Performed by: PHYSICIAN ASSISTANT

## 2022-04-01 PROCEDURE — A9575 INJ GADOTERATE MEGLUMI 0.1ML: HCPCS | Performed by: HOSPITALIST

## 2022-04-01 PROCEDURE — 93306 TTE W/DOPPLER COMPLETE: CPT | Performed by: PHYSICIAN ASSISTANT

## 2022-04-01 PROCEDURE — 85025 COMPLETE CBC W/AUTO DIFF WBC: CPT | Performed by: PHYSICIAN ASSISTANT

## 2022-04-01 PROCEDURE — 72158 MRI LUMBAR SPINE W/O & W/DYE: CPT | Performed by: NURSE PRACTITIONER

## 2022-04-01 RX ORDER — CEFAZOLIN SODIUM/WATER 2 G/20 ML
2 SYRINGE (ML) INTRAVENOUS EVERY 8 HOURS
Status: DISCONTINUED | OUTPATIENT
Start: 2022-04-01 | End: 2022-04-06

## 2022-04-01 NOTE — PLAN OF CARE
A/o x4. RA/. CHRISTIAN no CPAP. Tele: ST HR between 100's-130's lopressor given per order. Regular diet denies n/v. LBM 3/31 pt had one episode of diarrhea this evening states she had \"hard stool yesterday\". Pt placed on contact isolation r/o cdiff. Voiding freely. Small incision to back with gauze/tegaderm. Pt febrile this evening temp of 102.7 tylenol given per orders use of IS encouraged. Updated triston Barr with pt status. IVF infusing per order. IV Rocephin Q24hrs. Cultures pending. POC updated with pt. All safety measures in place. Call light within reach instructed pt to call for help or assistance. 65: this RN spoke with Ayesha Keith on the phone regarding patient status. MD gave verbal order to increase IVF rate from 75mL/hr to 100mL/hr and to order lactic lab. 3016: paged  to notify of lactic acid lab draw. 8834: Received call with critical blood cx results that were drawn on 3/31: gram positive cocci in pairs. PCR staph aureus not MRSA.     8409: Paged on call ID to notify of critical blood cx results. Waiting for response from Dr. Johnson Scott. Paged  to update on pt status. BP elevated treated with IV hydralazine. Advised to keep IVF at 100mL/hr rate. Also updated MD with blood cx will wait for ID call back. 8806: CAROLINE with Michelle PULLIAM with ID. Rocephin changed to ancef. Echo and blood cx will be ordered. Updated her with how pt night went.

## 2022-04-01 NOTE — PROGRESS NOTES
Received call from Radiologist regarding critical MRi result. Called critical results to [facility] for neurosurgery. Will review imaging. Patient told to remain NPO until RN hears back from [place].

## 2022-04-01 NOTE — PROGRESS NOTES
PA reviewed imaging. Will have Dr Edwin Canales review once out of OR. Patient remains neurologically intact. Patient may have clear liquids only as these will need to be held for only 4 hours prior to any surgical procedure. Patient and S.O. updated to current plan of care.

## 2022-04-01 NOTE — CM/SW NOTE
Spoke with Bernabe Castañeda from [facility] ([phone]) who stated pt is approved for in office, teach and train and home infusion. Spoke with Micheline from [facility] who does not anticipate weekend DC. Cultures pending. Met with pt to discuss DC planning. Reviewed options for infusion/IV abx: 1) nursing home, 2) home with home health care 3) home with in office teach and train vs 4) outpt infusion at MD office or infusion center. Discussed that infusion center can only accommodate once daily infusion. Pt stated preference for home infusion with [facility] services. Reviewed insurance coverage as provided by [facility] ($1000 deductible, $4000 out of pocket max). No other DC needs/concerns identified. Pt lives with her 79 y/o mother who is supportive. PT recommending outpt PT, but pt does not feel she will be able to start PT services for some time after DC. Spoke with Bernabe Castañeda from [facility] regarding plan for home infusion with [facility]. They will attempt to find accepting [facility] agency. / to remain available for support and/or discharge planning.      Judi Candelaria [facility]  Discharge Planner  [phone]

## 2022-04-01 NOTE — PLAN OF CARE
Pt a/ox4. Started on ancef this am per ID orders. Has remained afebrile since antibiotic switch this am. Blood cultures drawn. Continues to be tachycardic on telemetry, IV metoprolol for rate control. 2D echo completed, results pending. MRI w/wo contrast completed this afternoon, shows large epidural abscess from T9-S1. MD aware. Patient on clear liquids in anticipation for possible OR. Neurovascular status remains unchanged this shift. Will continue to monitor for any changes to NV status to BLE. Plan for PICC placement when blood cultures are negative, long term abx.

## 2022-04-02 ENCOUNTER — ANESTHESIA (OUTPATIENT)
Dept: SURGERY | Facility: HOSPITAL | Age: 67
End: 2022-04-02
Payer: COMMERCIAL

## 2022-04-02 ENCOUNTER — APPOINTMENT (OUTPATIENT)
Dept: GENERAL RADIOLOGY | Facility: HOSPITAL | Age: 67
End: 2022-04-02
Attending: NEUROLOGICAL SURGERY
Payer: COMMERCIAL

## 2022-04-02 ENCOUNTER — ANESTHESIA EVENT (OUTPATIENT)
Dept: SURGERY | Facility: HOSPITAL | Age: 67
End: 2022-04-02
Payer: COMMERCIAL

## 2022-04-02 LAB
ANION GAP SERPL CALC-SCNC: 9 MMOL/L (ref 0–18)
BASOPHILS # BLD AUTO: 0.04 X10(3) UL (ref 0–0.2)
BASOPHILS NFR BLD AUTO: 0.3 %
BUN BLD-MCNC: 11 MG/DL (ref 7–18)
CALCIUM BLD-MCNC: 8.6 MG/DL (ref 8.5–10.1)
CHLORIDE SERPL-SCNC: 102 MMOL/L (ref 98–112)
CO2 SERPL-SCNC: 21 MMOL/L (ref 21–32)
CREAT BLD-MCNC: 0.44 MG/DL
EOSINOPHIL # BLD AUTO: 0 X10(3) UL (ref 0–0.7)
EOSINOPHIL NFR BLD AUTO: 0 %
ERYTHROCYTE [DISTWIDTH] IN BLOOD BY AUTOMATED COUNT: 14.6 %
GLUCOSE BLD-MCNC: 91 MG/DL (ref 70–99)
HCT VFR BLD AUTO: 25.6 %
HGB BLD-MCNC: 8.1 G/DL
IMM GRANULOCYTES # BLD AUTO: 0.35 X10(3) UL (ref 0–1)
IMM GRANULOCYTES NFR BLD: 2.7 %
LYMPHOCYTES # BLD AUTO: 0.64 X10(3) UL (ref 1–4)
LYMPHOCYTES NFR BLD AUTO: 4.8 %
MCH RBC QN AUTO: 28.8 PG (ref 26–34)
MCHC RBC AUTO-ENTMCNC: 31.6 G/DL (ref 31–37)
MCV RBC AUTO: 91.1 FL
MONOCYTES # BLD AUTO: 0.96 X10(3) UL (ref 0.1–1)
MONOCYTES NFR BLD AUTO: 7.3 %
NEUTROPHILS # BLD AUTO: 11.21 X10 (3) UL (ref 1.5–7.7)
NEUTROPHILS # BLD AUTO: 11.21 X10(3) UL (ref 1.5–7.7)
NEUTROPHILS NFR BLD AUTO: 84.9 %
OSMOLALITY SERPL CALC.SUM OF ELEC: 273 MOSM/KG (ref 275–295)
PLATELET # BLD AUTO: 405 10(3)UL (ref 150–450)
POTASSIUM SERPL-SCNC: 4.2 MMOL/L (ref 3.5–5.1)
RBC # BLD AUTO: 2.81 X10(6)UL
SODIUM SERPL-SCNC: 132 MMOL/L (ref 136–145)
WBC # BLD AUTO: 13.2 X10(3) UL (ref 4–11)

## 2022-04-02 PROCEDURE — 00NY0ZZ RELEASE LUMBAR SPINAL CORD, OPEN APPROACH: ICD-10-PCS | Performed by: NEUROLOGICAL SURGERY

## 2022-04-02 PROCEDURE — 87075 CULTR BACTERIA EXCEPT BLOOD: CPT | Performed by: NEUROLOGICAL SURGERY

## 2022-04-02 PROCEDURE — 87176 TISSUE HOMOGENIZATION CULTR: CPT | Performed by: NEUROLOGICAL SURGERY

## 2022-04-02 PROCEDURE — 87116 MYCOBACTERIA CULTURE: CPT | Performed by: NEUROLOGICAL SURGERY

## 2022-04-02 PROCEDURE — 87070 CULTURE OTHR SPECIMN AEROBIC: CPT | Performed by: NEUROLOGICAL SURGERY

## 2022-04-02 PROCEDURE — 87205 SMEAR GRAM STAIN: CPT | Performed by: NEUROLOGICAL SURGERY

## 2022-04-02 PROCEDURE — 009U0ZZ DRAINAGE OF SPINAL CANAL, OPEN APPROACH: ICD-10-PCS | Performed by: NEUROLOGICAL SURGERY

## 2022-04-02 PROCEDURE — 87206 SMEAR FLUORESCENT/ACID STAI: CPT | Performed by: NEUROLOGICAL SURGERY

## 2022-04-02 PROCEDURE — 85025 COMPLETE CBC W/AUTO DIFF WBC: CPT | Performed by: HOSPITALIST

## 2022-04-02 PROCEDURE — 87102 FUNGUS ISOLATION CULTURE: CPT | Performed by: NEUROLOGICAL SURGERY

## 2022-04-02 PROCEDURE — 72020 X-RAY EXAM OF SPINE 1 VIEW: CPT | Performed by: NEUROLOGICAL SURGERY

## 2022-04-02 PROCEDURE — 80048 BASIC METABOLIC PNL TOTAL CA: CPT | Performed by: HOSPITALIST

## 2022-04-02 RX ORDER — HYDROMORPHONE HYDROCHLORIDE 1 MG/ML
0.4 INJECTION, SOLUTION INTRAMUSCULAR; INTRAVENOUS; SUBCUTANEOUS EVERY 5 MIN PRN
Status: DISCONTINUED | OUTPATIENT
Start: 2022-04-02 | End: 2022-04-02 | Stop reason: HOSPADM

## 2022-04-02 RX ORDER — ONDANSETRON 2 MG/ML
INJECTION INTRAMUSCULAR; INTRAVENOUS AS NEEDED
Status: DISCONTINUED | OUTPATIENT
Start: 2022-04-02 | End: 2022-04-02 | Stop reason: SURG

## 2022-04-02 RX ORDER — MEPERIDINE HYDROCHLORIDE 25 MG/ML
12.5 INJECTION INTRAMUSCULAR; INTRAVENOUS; SUBCUTANEOUS AS NEEDED
Status: DISCONTINUED | OUTPATIENT
Start: 2022-04-02 | End: 2022-04-02 | Stop reason: HOSPADM

## 2022-04-02 RX ORDER — DIPHENHYDRAMINE HYDROCHLORIDE 50 MG/ML
12.5 INJECTION INTRAMUSCULAR; INTRAVENOUS AS NEEDED
Status: DISCONTINUED | OUTPATIENT
Start: 2022-04-02 | End: 2022-04-02 | Stop reason: HOSPADM

## 2022-04-02 RX ORDER — SODIUM CHLORIDE, SODIUM LACTATE, POTASSIUM CHLORIDE, CALCIUM CHLORIDE 600; 310; 30; 20 MG/100ML; MG/100ML; MG/100ML; MG/100ML
INJECTION, SOLUTION INTRAVENOUS CONTINUOUS PRN
Status: DISCONTINUED | OUTPATIENT
Start: 2022-04-02 | End: 2022-04-02 | Stop reason: SURG

## 2022-04-02 RX ORDER — ROCURONIUM BROMIDE 10 MG/ML
INJECTION, SOLUTION INTRAVENOUS AS NEEDED
Status: DISCONTINUED | OUTPATIENT
Start: 2022-04-02 | End: 2022-04-02 | Stop reason: SURG

## 2022-04-02 RX ORDER — BUPIVACAINE HYDROCHLORIDE 5 MG/ML
INJECTION, SOLUTION EPIDURAL; INTRACAUDAL AS NEEDED
Status: DISCONTINUED | OUTPATIENT
Start: 2022-04-02 | End: 2022-04-02 | Stop reason: HOSPADM

## 2022-04-02 RX ORDER — METOPROLOL TARTRATE 5 MG/5ML
INJECTION INTRAVENOUS
Status: COMPLETED
Start: 2022-04-02 | End: 2022-04-02

## 2022-04-02 RX ORDER — METOPROLOL TARTRATE 5 MG/5ML
5 INJECTION INTRAVENOUS ONCE
Status: COMPLETED | OUTPATIENT
Start: 2022-04-02 | End: 2022-04-02

## 2022-04-02 RX ORDER — VANCOMYCIN HYDROCHLORIDE 1 G/20ML
INJECTION, POWDER, LYOPHILIZED, FOR SOLUTION INTRAVENOUS AS NEEDED
Status: DISCONTINUED | OUTPATIENT
Start: 2022-04-02 | End: 2022-04-02 | Stop reason: HOSPADM

## 2022-04-02 RX ORDER — AMLODIPINE BESYLATE 2.5 MG/1
2.5 TABLET ORAL DAILY PRN
Status: DISCONTINUED | OUTPATIENT
Start: 2022-04-02 | End: 2022-04-06

## 2022-04-02 RX ORDER — SODIUM CHLORIDE 9 MG/ML
INJECTION, SOLUTION INTRAVENOUS CONTINUOUS
Status: DISCONTINUED | OUTPATIENT
Start: 2022-04-02 | End: 2022-04-06

## 2022-04-02 RX ORDER — MIDAZOLAM HYDROCHLORIDE 1 MG/ML
1 INJECTION INTRAMUSCULAR; INTRAVENOUS EVERY 5 MIN PRN
Status: DISCONTINUED | OUTPATIENT
Start: 2022-04-02 | End: 2022-04-02 | Stop reason: HOSPADM

## 2022-04-02 RX ORDER — CEFAZOLIN SODIUM/WATER 2 G/20 ML
2 SYRINGE (ML) INTRAVENOUS EVERY 8 HOURS
Status: DISCONTINUED | OUTPATIENT
Start: 2022-04-02 | End: 2022-04-02

## 2022-04-02 RX ORDER — NALOXONE HYDROCHLORIDE 0.4 MG/ML
80 INJECTION, SOLUTION INTRAMUSCULAR; INTRAVENOUS; SUBCUTANEOUS AS NEEDED
Status: DISCONTINUED | OUTPATIENT
Start: 2022-04-02 | End: 2022-04-02 | Stop reason: HOSPADM

## 2022-04-02 RX ORDER — MIDAZOLAM HYDROCHLORIDE 1 MG/ML
INJECTION INTRAMUSCULAR; INTRAVENOUS AS NEEDED
Status: DISCONTINUED | OUTPATIENT
Start: 2022-04-02 | End: 2022-04-02 | Stop reason: SURG

## 2022-04-02 RX ORDER — ONDANSETRON 2 MG/ML
4 INJECTION INTRAMUSCULAR; INTRAVENOUS AS NEEDED
Status: DISCONTINUED | OUTPATIENT
Start: 2022-04-02 | End: 2022-04-02 | Stop reason: HOSPADM

## 2022-04-02 RX ORDER — HYDROMORPHONE HYDROCHLORIDE 1 MG/ML
INJECTION, SOLUTION INTRAMUSCULAR; INTRAVENOUS; SUBCUTANEOUS
Status: COMPLETED
Start: 2022-04-02 | End: 2022-04-02

## 2022-04-02 RX ORDER — PHENYLEPHRINE HCL 10 MG/ML
VIAL (ML) INJECTION AS NEEDED
Status: DISCONTINUED | OUTPATIENT
Start: 2022-04-02 | End: 2022-04-02 | Stop reason: SURG

## 2022-04-02 RX ORDER — DEXAMETHASONE SODIUM PHOSPHATE 4 MG/ML
VIAL (ML) INJECTION AS NEEDED
Status: DISCONTINUED | OUTPATIENT
Start: 2022-04-02 | End: 2022-04-02 | Stop reason: SURG

## 2022-04-02 RX ORDER — SODIUM CHLORIDE, SODIUM LACTATE, POTASSIUM CHLORIDE, CALCIUM CHLORIDE 600; 310; 30; 20 MG/100ML; MG/100ML; MG/100ML; MG/100ML
INJECTION, SOLUTION INTRAVENOUS CONTINUOUS
Status: DISCONTINUED | OUTPATIENT
Start: 2022-04-02 | End: 2022-04-02 | Stop reason: HOSPADM

## 2022-04-02 RX ADMIN — SODIUM CHLORIDE: 9 INJECTION, SOLUTION INTRAVENOUS at 15:46:00

## 2022-04-02 RX ADMIN — ONDANSETRON 4 MG: 2 INJECTION INTRAMUSCULAR; INTRAVENOUS at 17:16:00

## 2022-04-02 RX ADMIN — PHENYLEPHRINE HCL 100 MCG: 10 MG/ML VIAL (ML) INJECTION at 16:40:00

## 2022-04-02 RX ADMIN — SODIUM CHLORIDE, SODIUM LACTATE, POTASSIUM CHLORIDE, CALCIUM CHLORIDE: 600; 310; 30; 20 INJECTION, SOLUTION INTRAVENOUS at 18:22:00

## 2022-04-02 RX ADMIN — SODIUM CHLORIDE, SODIUM LACTATE, POTASSIUM CHLORIDE, CALCIUM CHLORIDE: 600; 310; 30; 20 INJECTION, SOLUTION INTRAVENOUS at 18:02:00

## 2022-04-02 RX ADMIN — SODIUM CHLORIDE, SODIUM LACTATE, POTASSIUM CHLORIDE, CALCIUM CHLORIDE: 600; 310; 30; 20 INJECTION, SOLUTION INTRAVENOUS at 18:01:00

## 2022-04-02 RX ADMIN — SODIUM CHLORIDE: 9 INJECTION, SOLUTION INTRAVENOUS at 18:00:00

## 2022-04-02 RX ADMIN — ROCURONIUM BROMIDE 50 MG: 10 INJECTION, SOLUTION INTRAVENOUS at 15:55:00

## 2022-04-02 RX ADMIN — DEXAMETHASONE SODIUM PHOSPHATE 8 MG: 4 MG/ML VIAL (ML) INJECTION at 17:16:00

## 2022-04-02 RX ADMIN — PHENYLEPHRINE HCL 100 MCG: 10 MG/ML VIAL (ML) INJECTION at 16:30:00

## 2022-04-02 RX ADMIN — MIDAZOLAM HYDROCHLORIDE 2 MG: 1 INJECTION INTRAMUSCULAR; INTRAVENOUS at 15:50:00

## 2022-04-02 RX ADMIN — PHENYLEPHRINE HCL 200 MCG: 10 MG/ML VIAL (ML) INJECTION at 16:10:00

## 2022-04-02 RX ADMIN — MIDAZOLAM HYDROCHLORIDE 2 MG: 1 INJECTION INTRAMUSCULAR; INTRAVENOUS at 15:53:00

## 2022-04-02 RX ADMIN — CEFAZOLIN SODIUM/WATER 2 G: 2 G/20 ML SYRINGE (ML) INTRAVENOUS at 16:19:00

## 2022-04-02 NOTE — BRIEF OP NOTE
Pre-Operative Diagnosis: Abscess in epidural space of lumbar spine [G06.1]     Post-Operative Diagnosis: Abscess in epidural space of lumbar spine [G06.1]      Procedure Performed:   RIGHT LUMBAR 1, LUMBAR 2 HEMILAMINECTOMY FOR EPIDURAL ABSCESS WITH MICROSCOPE DISSECTION    Surgeon(s) and Role:  Welford Cranker, MD - Primary    Surgical Findings: Thick somewhat inflamed epidural fat no gross pus.      Specimen: Epidural tissue     Estimated Blood Loss: 10 cc       Jenifer May MD  4/2/2022  6:14 PM

## 2022-04-02 NOTE — PLAN OF CARE
Direct admit Rt sided low back pain, Pt is AAOX4, BP varies, monitor for elevated BP, CHRISTIAN, no CPAP, TELE, IVF w/ IV ABX, Pt may need PICC line Monday, voiding freely to restroom, PO and IV pain meds given, see MAR, Pt to OR to wash out epidural abscess, will need new PT / OT eval post-op, Pt doing well, all needs met, all safety measures in place, call light within reach, will CTM.

## 2022-04-02 NOTE — ANESTHESIA PROCEDURE NOTES
Airway  Date/Time: 4/2/2022 3:57 PM  Urgency: elective      General Information and Staff    Patient location during procedure: OR  Anesthesiologist: Monica Villela MD  Performed: anesthesiologist     Indications and Patient Condition  Indications for airway management: anesthesia  Spontaneous ventilation: present  Sedation level: minimal  Preoxygenated: yes  Patient position: sniffing  Mask difficulty assessment: 1 - vent by mask    Final Airway Details  Final airway type: endotracheal airway      Successful airway: ETT  Cuffed: yes   Successful intubation technique: Video laryngoscopy  Facilitating devices/methods: intubating stylet  Endotracheal tube insertion site: oral  Blade: GlideScope  Blade size: #3  ETT size (mm): 7.5    Cormack-Lehane Classification: grade IIA - partial view of glottis  Placement verified by: chest auscultation and capnometry   Measured from: lips  ETT to lips (cm): 21  Number of attempts at approach: 1  Number of other approaches attempted: 0

## 2022-04-02 NOTE — ANESTHESIA PROCEDURE NOTES
Peripheral IV  Date/Time: 4/2/2022 4:05 PM  Inserted by: Dora Villa MD    Placement  Needle size: 18 G  Laterality: left  Location: hand  Local anesthetic: none  Site prep: Betadine  Technique: anatomical landmarks  Attempts: 1

## 2022-04-02 NOTE — PLAN OF CARE
Patient A&O X4 on RA. VSS, /IS/telemetry- ST, on scheduled Metoprolol Q6h. SCDs. Voiding freely, LBM 3/31- no diarrhea this shift, cdiff/isolation precautions discontinued per protocol. On IV Ancef Q8h. IVF infusing per protocol. Pain to back controlled with PO medication. Up min assist with cane. Reminded to use call light. Plan for possible surgery tomorrow. 2240: Received call from Dr Miriam Rushing. Stated to make patient NPO and he will speak with patient tomorrow. 0000: Sepsis BPA firing. On call MD paged- no new orders, BPA fired previous night, already on antibiotics/blood cultures pending.

## 2022-04-02 NOTE — ANESTHESIA POSTPROCEDURE EVALUATION
[facility] 81 Patient Status:  Inpatient   Age/Gender 61year old female MRN [id number]   [facility] SURGERY Attending Juli Prescott MD   [address] Day # 3 PCP Chito Black MD       Anesthesia Post-op Note    RIGHT LUMBAR 1 - LUMBAR 2 - HEMILAMINECTOMY AND DISCECTOMY     Procedure Summary     Date: 04/02/22 Room / Location: [facility] MAIN OR [address] [place] [address] MAIN OR    Anesthesia Start: 9907 Anesthesia Stop:     Procedure: RIGHT LUMBAR 1 - LUMBAR 2 - HEMILAMINECTOMY AND DISCECTOMY (N/A Spine Lumbar) Diagnosis:       Abscess in epidural space of lumbar spine      (Abscess in epidural space of lumbar spine [G06.1])    Surgeons: Zoila Benton MD Anesthesiologist: Marva De Los Santos MD    Anesthesia Type: general ASA Status: 2          Anesthesia Type: general    Vitals Value Taken Time   /98 04/02/22 1823   Temp 98.2 04/02/22 1823   Pulse 125 04/02/22 1823   Resp 23 04/02/22 1823   SpO2 100 04/02/22 1823       Patient Location: PACU    Anesthesia Type: general    Airway Patency: patent and extubated    Postop Pain Control: adequate    Mental Status: mildly sedated but able to meaningfully participate in the post-anesthesia evaluation    Nausea/Vomiting: none    Cardiopulmonary/Hydration status: stable euvolemic    Complications: no apparent anesthesia related complications    Postop vital signs: stable    Dental Exam: Unchanged from Preop    Patient to be discharged from PACU when criteria met.

## 2022-04-02 NOTE — PROGRESS NOTES
Neurosurgery Attending    Preoperative note    4/2/2022    Cecelia Eagle     Admitted several days ago for evaluation of possible discitis with paraspinal involvement. Blood cultures positive for staph aureus. Repeat MRI yesterday shows worsening epidural abscess thickest at the L1-L2 area with compression of the thecal sac and shift from right to left. Plan right L1-L2 and inferior portion of T12 hemilaminectomies for washout of epidural abscess and if inflammation is not too severe attempt at T12-L1 disc debridement and washout. [place].  Macarena Thayer, [address] [facility]  Neurological Surgery    Co-Director  [place] [address] Raquel Adams

## 2022-04-03 ENCOUNTER — APPOINTMENT (OUTPATIENT)
Dept: CT IMAGING | Facility: HOSPITAL | Age: 67
End: 2022-04-03
Attending: INTERNAL MEDICINE
Payer: COMMERCIAL

## 2022-04-03 LAB
CREAT BLD-MCNC: 0.62 MG/DL
STAPHYLOCOCCUS AUREUS, NOT MRSA BY PCR: DETECTED

## 2022-04-03 PROCEDURE — 97530 THERAPEUTIC ACTIVITIES: CPT

## 2022-04-03 PROCEDURE — 97116 GAIT TRAINING THERAPY: CPT

## 2022-04-03 PROCEDURE — 97164 PT RE-EVAL EST PLAN CARE: CPT

## 2022-04-03 PROCEDURE — 74177 CT ABD & PELVIS W/CONTRAST: CPT | Performed by: INTERNAL MEDICINE

## 2022-04-03 PROCEDURE — 82565 ASSAY OF CREATININE: CPT | Performed by: INTERNAL MEDICINE

## 2022-04-03 PROCEDURE — 97165 OT EVAL LOW COMPLEX 30 MIN: CPT

## 2022-04-03 RX ORDER — IOHEXOL 350 MG/ML
85 INJECTION, SOLUTION INTRAVENOUS
Status: COMPLETED | OUTPATIENT
Start: 2022-04-03 | End: 2022-04-03

## 2022-04-03 NOTE — PLAN OF CARE
POD 1 L1-L2 laminectomy / diskectomy w/ ABX washout, Pt is AAOX4, BP more normalized as is HR today, CHRISTIAN, no CPAP, TELE, IV SL w/ IV ABX, will need PICC line Monday, voiding freely to purewick, PO meds for pain given, see MAR, PT / OT following, plan H w/HH, dermabond and coverlet dressing remain CDI, Pt doing well, all needs met, all safety measures in place, call light within reach, will CTM.

## 2022-04-03 NOTE — PLAN OF CARE
Received from PACU awake. BP stable, HR on low 100's. States rt leg pain improved but starting to have discomfort to lower back surgical site, requesting for Rumney, declined Morphine. Strong dorsi/plantar flexion to both ankles, skin warm, PP +2, denies n/t to ble. Coverlet dsg to back cdi. Will be BR tonight, PT/OT tomorrow.

## 2022-04-04 LAB
ANION GAP SERPL CALC-SCNC: 5 MMOL/L (ref 0–18)
BASOPHILS # BLD: 0 X10(3) UL (ref 0–0.2)
BASOPHILS NFR BLD: 0 %
BUN BLD-MCNC: 14 MG/DL (ref 7–18)
CALCIUM BLD-MCNC: 8.5 MG/DL (ref 8.5–10.1)
CHLORIDE SERPL-SCNC: 105 MMOL/L (ref 98–112)
CO2 SERPL-SCNC: 25 MMOL/L (ref 21–32)
CREAT BLD-MCNC: 0.42 MG/DL
EOSINOPHIL # BLD: 0 X10(3) UL (ref 0–0.7)
EOSINOPHIL NFR BLD: 0 %
ERYTHROCYTE [DISTWIDTH] IN BLOOD BY AUTOMATED COUNT: 14.3 %
GLUCOSE BLD-MCNC: 86 MG/DL (ref 70–99)
HCT VFR BLD AUTO: 25.4 %
HGB BLD-MCNC: 8.1 G/DL
LYMPHOCYTES NFR BLD: 0.5 X10(3) UL (ref 1–4)
LYMPHOCYTES NFR BLD: 4 %
MCH RBC QN AUTO: 28.5 PG (ref 26–34)
MCHC RBC AUTO-ENTMCNC: 31.9 G/DL (ref 31–37)
MCV RBC AUTO: 89.4 FL
METAMYELOCYTES # BLD: 0.13 X10(3) UL
METAMYELOCYTES NFR BLD: 1 %
MONOCYTES # BLD: 0.76 X10(3) UL (ref 0.1–1)
MONOCYTES NFR BLD: 6 %
MORPHOLOGY: NORMAL
NEUTROPHILS # BLD AUTO: 10.17 X10 (3) UL (ref 1.5–7.7)
NEUTROPHILS NFR BLD: 89 %
NEUTS HYPERSEG # BLD: 11.21 X10(3) UL (ref 1.5–7.7)
OSMOLALITY SERPL CALC.SUM OF ELEC: 280 MOSM/KG (ref 275–295)
PLATELET # BLD AUTO: 463 10(3)UL (ref 150–450)
PLATELET MORPHOLOGY: NORMAL
POTASSIUM SERPL-SCNC: 4.1 MMOL/L (ref 3.5–5.1)
RBC # BLD AUTO: 2.84 X10(6)UL
SODIUM SERPL-SCNC: 135 MMOL/L (ref 136–145)
TOTAL CELLS COUNTED BLD: 100
WBC # BLD AUTO: 12.6 X10(3) UL (ref 4–11)

## 2022-04-04 PROCEDURE — 85007 BL SMEAR W/DIFF WBC COUNT: CPT | Performed by: HOSPITALIST

## 2022-04-04 PROCEDURE — 85025 COMPLETE CBC W/AUTO DIFF WBC: CPT | Performed by: HOSPITALIST

## 2022-04-04 PROCEDURE — 02HV33Z INSERTION OF INFUSION DEVICE INTO SUPERIOR VENA CAVA, PERCUTANEOUS APPROACH: ICD-10-PCS | Performed by: HOSPITALIST

## 2022-04-04 PROCEDURE — 80048 BASIC METABOLIC PNL TOTAL CA: CPT | Performed by: HOSPITALIST

## 2022-04-04 PROCEDURE — 85027 COMPLETE CBC AUTOMATED: CPT | Performed by: HOSPITALIST

## 2022-04-04 PROCEDURE — 36573 INSJ PICC RS&I 5 YR+: CPT

## 2022-04-04 PROCEDURE — 97530 THERAPEUTIC ACTIVITIES: CPT

## 2022-04-04 PROCEDURE — 97116 GAIT TRAINING THERAPY: CPT

## 2022-04-04 RX ORDER — ORPHENADRINE CITRATE 30 MG/ML
30 INJECTION INTRAMUSCULAR; INTRAVENOUS EVERY 6 HOURS SCHEDULED
Status: DISCONTINUED | OUTPATIENT
Start: 2022-04-04 | End: 2022-04-06

## 2022-04-04 RX ORDER — HYDROCODONE BITARTRATE AND ACETAMINOPHEN 5; 325 MG/1; MG/1
2 TABLET ORAL EVERY 4 HOURS PRN
Status: DISCONTINUED | OUTPATIENT
Start: 2022-04-04 | End: 2022-04-06

## 2022-04-04 RX ORDER — LIDOCAINE HYDROCHLORIDE 10 MG/ML
5 INJECTION, SOLUTION EPIDURAL; INFILTRATION; INTRACAUDAL; PERINEURAL
Status: COMPLETED | OUTPATIENT
Start: 2022-04-04 | End: 2022-04-04

## 2022-04-04 RX ORDER — HYDROCODONE BITARTRATE AND ACETAMINOPHEN 5; 325 MG/1; MG/1
1 TABLET ORAL EVERY 4 HOURS PRN
Status: DISCONTINUED | OUTPATIENT
Start: 2022-04-04 | End: 2022-04-06

## 2022-04-04 NOTE — CM/SW NOTE
Spoke with pt's RN. PICC placement today. DC tomorrow per neurosx note. Spoke with Nate Cleveland from [facility] ([phone]) regarding DC planning. She stated Elvi Reynoso may be able to accept pt for [facility] services. Referral sent to [facility] via [address]. / to remain available for support and/or discharge planning.      Guillermo Saba, [facility]  Discharge Planner  [phone]

## 2022-04-04 NOTE — PLAN OF CARE
Pt a/oX4 on room air. Bilateral SCD. . Tele- NSR. Voiding via purewick  PO medication given for pain. Dressing is c/d/I. Denies n/t. Call light within reach. Safety precautions in place.

## 2022-04-04 NOTE — PLAN OF CARE
POD 2 L1-L2 laminectomy / diskectomy w/ ABX washout, Pt is AAOX4, BP elevated at times, HR elevated at times, meds adjusted and given for both see MAR, CHRISTIAN, no CPAP, TELE, IV SL w/ IV ABX, PICC line placed to upper Rt arm, voiding freely to purewick or restroom, PO meds for pain given, see MAR, PT / OT following, plan H w/HH, dermabond and coverlet dressing remain CDI, Pt doing well, all needs met, all safety measures in place, call light within reach, will CTM.

## 2022-04-04 NOTE — PROGRESS NOTES
Neurological Surgery Attending    Kourtney Hsu    Interval History: Lumbar decompression yesterday for epidural abscess. Findings were primarily inflamed fat and thickened inflamed ligamentum flavum. Today her only complaint is the incisional pain. She feels her legs are more steady. Interval Examination: Surgical dressing is dry. Leg strength is 5/5 proximally and distally in both legs. Imaging: No new imaging. Assessment: Stable neurologically with epidural and soft tissue abscess. Plan: Await culture results from the operating room. Management of soft tissue infection per ID service. Michelle Net.  Yolanda Stapleton, [address] [facility]  Neurological Surgery    Co-Director  [place] [address] [facility]

## 2022-04-05 PROCEDURE — 97530 THERAPEUTIC ACTIVITIES: CPT

## 2022-04-05 PROCEDURE — 97535 SELF CARE MNGMENT TRAINING: CPT

## 2022-04-05 PROCEDURE — 97116 GAIT TRAINING THERAPY: CPT

## 2022-04-05 NOTE — PROGRESS NOTES
Reviewed indications, side effects of pain medication/narcotics and constipation prevention. Stressed importance of increased fluids/roughage in diet, continued use stool softeners along with laxatives and suppositories as needed while taking narcotics even when at home. Pt verbalized understanding. Teachback done on ankle pumps and incentive spirometry use 10 times every hour w/a for each. Reviewed dressing changes, incision care, showering, restrictions, activity and when to contact surgeon for concerns before beginning discharge education video. Solicited and answered any questions regarding care. Patient verbalized understanding. Guidebook provided.

## 2022-04-05 NOTE — PLAN OF CARE
Pt a/oX4 on room air. . Tele- ST. Tolerating diet. Norco given for pain with relief. Coverlet to back is c/d/I. Ambulating in hallway with staff min assist with walker and gait belt. Call light within reach. Safety precautions in place.

## 2022-04-05 NOTE — PLAN OF CARE
A&Ox4. Telemetry monitoring - remains sinus tach. SCDs. Tolerating diet. Last BM today. Voiding freely. Pain controlled with PO medication. Coverlet to back C/D/I. PICC to RUE. IV Ancef Q8h. Ambulating with walker and min assist. Plan is for dc on IV abx when cleared. Patient updated on plan of care. Safety precautions in place. Call light within reach. Will continue to monitor.

## 2022-04-06 VITALS
HEART RATE: 121 BPM | TEMPERATURE: 98 F | OXYGEN SATURATION: 96 % | SYSTOLIC BLOOD PRESSURE: 154 MMHG | BODY MASS INDEX: 26 KG/M2 | RESPIRATION RATE: 17 BRPM | DIASTOLIC BLOOD PRESSURE: 86 MMHG | WEIGHT: 143.06 LBS

## 2022-04-06 RX ORDER — CYCLOBENZAPRINE HCL 5 MG
5 TABLET ORAL 3 TIMES DAILY PRN
Qty: 30 TABLET | Refills: 0 | Status: SHIPPED | OUTPATIENT
Start: 2022-04-06 | End: 2022-04-15

## 2022-04-06 RX ORDER — METOPROLOL TARTRATE 100 MG/1
100 TABLET ORAL
Qty: 60 TABLET | Refills: 2 | Status: SHIPPED | OUTPATIENT
Start: 2022-04-06

## 2022-04-06 RX ORDER — HYDROCODONE BITARTRATE AND ACETAMINOPHEN 5; 325 MG/1; MG/1
1-2 TABLET ORAL EVERY 4 HOURS PRN
Qty: 60 TABLET | Refills: 0 | Status: SHIPPED | OUTPATIENT
Start: 2022-04-06

## 2022-04-06 RX ORDER — METOPROLOL TARTRATE 50 MG/1
100 TABLET, FILM COATED ORAL
Status: DISCONTINUED | OUTPATIENT
Start: 2022-04-06 | End: 2022-04-06

## 2022-04-06 NOTE — PLAN OF CARE
Alert and oriented,V/S stable,,room air. SCD'S to BLE. Denies numbness or tingling sensation to BLE. On tele,rhythm stable. Denies SOB OR CHEST DISCOMFORT. Back incision PASHA with skin glue,well approximated. Voiding without problems. Safety measures reinforced,call light kept within reach,instructed to call for help or assist.Verbalizes understanding.

## 2022-04-06 NOTE — CM/SW NOTE
Informed by RN that pt can discharge to home today. Terrie Shah with [facility] who will arrange for delivery of meds/supplies to pt's home. Updated Sanaz from [facility] who confirmed pt scheduled for start of care in AM.  No further needs at this time. / to remain available for support and/or discharge planning.      Shaniat Melo [facility]  Discharge Planner  [phone]

## 2022-04-06 NOTE — PLAN OF CARE
Pt  is AOx4, on R. A.  VS are stable, BP more stable today so far. HR at rest has been staying around 90. Voiding freely, last BM was on 4/5. On IV Ancef via right PICC. Denies n/t. Reports her pain is mostly in lower back and some to the left buttocks. Incision is with dermabond PASHA. Pt c/o moderate pain relieved by PO pain meds. Up with min assistance, tolerated well. Ambulated in the hallway. IS education complete. Ankle pumps encouraged. Pt instructed to call for assistance, belongings within reach.

## 2022-04-07 ENCOUNTER — PATIENT OUTREACH (OUTPATIENT)
Dept: CASE MANAGEMENT | Age: 67
End: 2022-04-07

## 2022-04-07 NOTE — PAYOR COMM NOTE
--------------  DISCHARGE REVIEW    Payor: [facility] PPO  Subscriber #:  [id number]  Authorization Number: [id number]    Admit date: 3/30/22  Admit time:   3:11 PM  Discharge Date: 4/6/2022  5:06 PM     Admitting Physician: Liana Carney MD  Attending Physician:  Bita att. providers found  Primary Care Physician: Honorio Simmons MD

## 2022-04-11 ENCOUNTER — LAB ENCOUNTER (OUTPATIENT)
Dept: LAB | Age: 67
End: 2022-04-11
Attending: FAMILY MEDICINE
Payer: COMMERCIAL

## 2022-04-11 ENCOUNTER — OFFICE VISIT (OUTPATIENT)
Dept: FAMILY MEDICINE CLINIC | Facility: CLINIC | Age: 67
End: 2022-04-11
Payer: COMMERCIAL

## 2022-04-11 VITALS
BODY MASS INDEX: 26 KG/M2 | TEMPERATURE: 98 F | HEART RATE: 80 BPM | RESPIRATION RATE: 16 BRPM | SYSTOLIC BLOOD PRESSURE: 128 MMHG | DIASTOLIC BLOOD PRESSURE: 82 MMHG | HEIGHT: 62 IN

## 2022-04-11 DIAGNOSIS — E55.9 VITAMIN D DEFICIENCY: ICD-10-CM

## 2022-04-11 DIAGNOSIS — I10 BENIGN ESSENTIAL HYPERTENSION: ICD-10-CM

## 2022-04-11 DIAGNOSIS — R00.0 SINUS TACHYCARDIA: ICD-10-CM

## 2022-04-11 DIAGNOSIS — K83.8 BILE DUCT PROLIFERATION: Primary | ICD-10-CM

## 2022-04-11 DIAGNOSIS — K83.8 COMMON BILE DUCT DILATION: ICD-10-CM

## 2022-04-11 DIAGNOSIS — Z13.220 SCREENING FOR LIPID DISORDERS: ICD-10-CM

## 2022-04-11 DIAGNOSIS — G06.2 EPIDURAL ABSCESS: Primary | ICD-10-CM

## 2022-04-11 DIAGNOSIS — R79.89 ELEVATED TSH: ICD-10-CM

## 2022-04-11 LAB
ALBUMIN SERPL-MCNC: 2.2 G/DL (ref 3.4–5)
ALBUMIN/GLOB SERPL: 0.5 {RATIO} (ref 1–2)
ALP LIVER SERPL-CCNC: 140 U/L
ALT SERPL-CCNC: 21 U/L
ANION GAP SERPL CALC-SCNC: 7 MMOL/L (ref 0–18)
AST SERPL-CCNC: 20 U/L (ref 15–37)
BASOPHILS # BLD: 0.17 X10(3) UL (ref 0–0.2)
BASOPHILS NFR BLD: 1 %
BILIRUB SERPL-MCNC: 0.3 MG/DL (ref 0.1–2)
BUN BLD-MCNC: 8 MG/DL (ref 7–18)
CALCIUM BLD-MCNC: 8.7 MG/DL (ref 8.5–10.1)
CHLORIDE SERPL-SCNC: 94 MMOL/L (ref 98–112)
CHOLEST SERPL-MCNC: 121 MG/DL (ref ?–200)
CO2 SERPL-SCNC: 27 MMOL/L (ref 21–32)
CREAT BLD-MCNC: 0.57 MG/DL
EOSINOPHIL # BLD: 0 X10(3) UL (ref 0–0.7)
EOSINOPHIL NFR BLD: 0 %
ERYTHROCYTE [DISTWIDTH] IN BLOOD BY AUTOMATED COUNT: 14.4 %
FASTING PATIENT LIPID ANSWER: YES
FASTING STATUS PATIENT QL REPORTED: YES
GLOBULIN PLAS-MCNC: 4.2 G/DL (ref 2.8–4.4)
GLUCOSE BLD-MCNC: 87 MG/DL (ref 70–99)
HAV IGM SER QL: NONREACTIVE
HBV CORE IGM SER QL: NONREACTIVE
HBV SURFACE AG SERPL QL IA: NONREACTIVE
HCT VFR BLD AUTO: 25.7 %
HCV AB SERPL QL IA: NONREACTIVE
HDLC SERPL-MCNC: 42 MG/DL (ref 40–59)
HGB BLD-MCNC: 8.1 G/DL
LDLC SERPL CALC-MCNC: 56 MG/DL (ref ?–100)
LYMPHOCYTES NFR BLD: 1.36 X10(3) UL (ref 1–4)
LYMPHOCYTES NFR BLD: 8 %
MCH RBC QN AUTO: 29 PG (ref 26–34)
MCHC RBC AUTO-ENTMCNC: 31.5 G/DL (ref 31–37)
MCV RBC AUTO: 92.1 FL
METAMYELOCYTES # BLD: 0.17 X10(3) UL
METAMYELOCYTES NFR BLD: 1 %
MONOCYTES # BLD: 1.19 X10(3) UL (ref 0.1–1)
MONOCYTES NFR BLD: 7 %
MORPHOLOGY: NORMAL
NEUTROPHILS # BLD AUTO: 13.02 X10 (3) UL (ref 1.5–7.7)
NEUTROPHILS NFR BLD: 82 %
NEUTS BAND NFR BLD: 1 %
NEUTS HYPERSEG # BLD: 14.11 X10(3) UL (ref 1.5–7.7)
NONHDLC SERPL-MCNC: 79 MG/DL (ref ?–130)
OSMOLALITY SERPL CALC.SUM OF ELEC: 264 MOSM/KG (ref 275–295)
PLATELET # BLD AUTO: 498 10(3)UL (ref 150–450)
PLATELET MORPHOLOGY: NORMAL
POTASSIUM SERPL-SCNC: 4.5 MMOL/L (ref 3.5–5.1)
PROT SERPL-MCNC: 6.4 G/DL (ref 6.4–8.2)
RBC # BLD AUTO: 2.79 X10(6)UL
SODIUM SERPL-SCNC: 128 MMOL/L (ref 136–145)
T4 FREE SERPL-MCNC: 1 NG/DL (ref 0.8–1.7)
TOTAL CELLS COUNTED BLD: 100
TRIGL SERPL-MCNC: 129 MG/DL (ref 30–149)
TSI SER-ACNC: 14.7 MIU/ML (ref 0.36–3.74)
VIT D+METAB SERPL-MCNC: 33.6 NG/ML (ref 30–100)
VLDLC SERPL CALC-MCNC: 19 MG/DL (ref 0–30)
WBC # BLD AUTO: 17 X10(3) UL (ref 4–11)

## 2022-04-11 PROCEDURE — 85027 COMPLETE CBC AUTOMATED: CPT

## 2022-04-11 PROCEDURE — 3079F DIAST BP 80-89 MM HG: CPT | Performed by: FAMILY MEDICINE

## 2022-04-11 PROCEDURE — 84439 ASSAY OF FREE THYROXINE: CPT

## 2022-04-11 PROCEDURE — 83516 IMMUNOASSAY NONANTIBODY: CPT

## 2022-04-11 PROCEDURE — 80074 ACUTE HEPATITIS PANEL: CPT

## 2022-04-11 PROCEDURE — 99495 TRANSJ CARE MGMT MOD F2F 14D: CPT | Performed by: FAMILY MEDICINE

## 2022-04-11 PROCEDURE — 80061 LIPID PANEL: CPT

## 2022-04-11 PROCEDURE — 85007 BL SMEAR W/DIFF WBC COUNT: CPT

## 2022-04-11 PROCEDURE — 80053 COMPREHEN METABOLIC PANEL: CPT

## 2022-04-11 PROCEDURE — 82306 VITAMIN D 25 HYDROXY: CPT

## 2022-04-11 PROCEDURE — 84443 ASSAY THYROID STIM HORMONE: CPT

## 2022-04-11 PROCEDURE — 3074F SYST BP LT 130 MM HG: CPT | Performed by: FAMILY MEDICINE

## 2022-04-11 PROCEDURE — 85025 COMPLETE CBC W/AUTO DIFF WBC: CPT

## 2022-04-11 PROCEDURE — 3008F BODY MASS INDEX DOCD: CPT | Performed by: FAMILY MEDICINE

## 2022-04-11 NOTE — PATIENT INSTRUCTIONS
Check temp once daily - call if any fever 100 or greater or if any symptoms of fever (chills, new body aches, sweats). Call if pulse less than 60. Call if getting lightheaded with standing.

## 2022-04-13 LAB
F-ACTIN (SMOOTH MUSCLE) AB: 7 UNITS
MITOCHONDRIAL M2 AB, IGG: 2.7 UNITS

## 2022-04-13 NOTE — OPERATIVE REPORT
[facility]    OPERATIVE REPORT    Patient:  Haylie Haro;  YOB: 1955     CSN:  [id number]; Medical Record Number:  [id number]    Admission Date:  3/30/2022 Operation Date:  3/30/2022 - 4/2/2022    . .......................... Operating Physician:  Andrew Draper MD    Circulating Nurse.: Melly Means RN  Scrub Person.: Hilary Gaona RN    Pre-Operative Diagnosis: Abscess in epidural space of lumbar spine [G06.1]    Post-Operative Diagnosis: Same as above    Procedure Performed: RIGHT LUMBAR 1, LUMBAR 2 HEMILAMINECTOMY FOR EPIDURAL ABSCESS WITH MICROSCOPE DISSECTION    Anesthesia: General    EBL: 10 cc    Indications: Enlarging epidural fluid collection serial MRIs with known T12-L1 osteomyelitis. The thickest portion of the epidural collection is at L2 on the most current MRI. Findings: Very inflamed and thickened epidural fat and soft tissue was found with no gross pus. A red rubber catheter was threaded up and down the spinal canal and irrigated with antibiotic solution from the L1-L2 epidural exposure to wash out any potential liquid pus. Procedure: General endotracheal anesthesia was accomplished in the supine position on the transportation cart. Appropriate physiological monitoring was instituted. Sequential compression boots and Olson catheter were employed. Patient was then carefully rolled over to the prone position on the spinal frame. Skin was prepped and a spinal needle placed for localization of the L1-2 level. This area was then prepped and draped in a sterile fashion. A midline incision from L1-L2 was carried out a right-sided subperiosteal dissection was carried out. Deep self-retaining retractor was placed and a second x-ray confirmed exposure to the appropriate level. The operative microscope was brought in the field. Under operative magnification a right L1 and L2 hemilaminectomy was drilled down to ligamentum flavum.   Ligamentum flavum was opened underneath was found very thickened inflamed phlegmon of fat and rubbery tissue but no gross pus. I very carefully dissected through this tissue until the dura was identified and then completely removed all this tissue from the epidural space essentially from midline all the way out into the right lateral gutter. The scar tissue is thick that I did not feel it was safe to try and carried the laminectomy up to T12 and get into the T12-L1 disc. I simply threaded a thin red rubber catheter of the spinal canal several segments and down the spinal canal several segments and irrigated with antibiotic saline after having cultured all of the epidural tissue. At this point the dura was nicely pulsatile and closure was initiated. The lumbar fascia was closed with interrupted 0 Vicryl suture. Subcutaneous tissue with 2-0 and 3-0 Vicryl suture in layers. The skin with 4-0 Vicryl and Dermabond. A sterile dressing was applied. She was carefully rolled back over to the transportation cart and transported to the PACU after extubation. [place].  Edwin Canales, [address] [facility]  Neurological Surgery    Co-Director  [facility]  [address]quel Amin

## 2022-04-15 RX ORDER — CYCLOBENZAPRINE HCL 5 MG
5 TABLET ORAL 3 TIMES DAILY PRN
Qty: 60 TABLET | Refills: 1 | Status: SHIPPED | OUTPATIENT
Start: 2022-04-15

## 2022-04-15 NOTE — TELEPHONE ENCOUNTER
Pt called requesting refill for cyclobenzaprine 5 MG Oral Tabmedication.       [facility] [address] [place] [address],[address], [address] [facility] AT [address] [place] [address], [phone], [phone]

## 2022-04-15 NOTE — TELEPHONE ENCOUNTER
Medication: cyclobenzaprine     Date of last refill: 4/6/22 (#30/0)  [place]       Last office visit:    Due back to clinic per last office note:     Date next office visit scheduled:    Future Appointments   Date Time Provider Edis Núñez   4/19/2022  2:45 PM Elena Houston, PABASILIO ENINAPER2 [facility]           Last OV note recommendation:    Pt had surgery on 4/2/22  RIGHT LUMBAR 1 - LUMBAR 2 - HEMILAMINECTOMY AND DISCECTOMY

## 2022-04-19 ENCOUNTER — PATIENT MESSAGE (OUTPATIENT)
Dept: SURGERY | Facility: CLINIC | Age: 67
End: 2022-04-19

## 2022-04-19 ENCOUNTER — OFFICE VISIT (OUTPATIENT)
Dept: SURGERY | Facility: CLINIC | Age: 67
End: 2022-04-19
Payer: COMMERCIAL

## 2022-04-19 VITALS
SYSTOLIC BLOOD PRESSURE: 160 MMHG | BODY MASS INDEX: 26.31 KG/M2 | HEART RATE: 80 BPM | WEIGHT: 143 LBS | HEIGHT: 62 IN | DIASTOLIC BLOOD PRESSURE: 92 MMHG

## 2022-04-19 DIAGNOSIS — Z98.890 S/P LAMINECTOMY: ICD-10-CM

## 2022-04-19 DIAGNOSIS — M86.9 OSTEOMYELITIS OF OTHER SITE, UNSPECIFIED TYPE (HCC): ICD-10-CM

## 2022-04-19 DIAGNOSIS — Z98.890 POST-OPERATIVE STATE: ICD-10-CM

## 2022-04-19 DIAGNOSIS — T14.8XXA SKIN WOUND FROM SURGICAL INCISION: Primary | ICD-10-CM

## 2022-04-19 PROCEDURE — 3077F SYST BP >= 140 MM HG: CPT | Performed by: PHYSICIAN ASSISTANT

## 2022-04-19 PROCEDURE — 3008F BODY MASS INDEX DOCD: CPT | Performed by: PHYSICIAN ASSISTANT

## 2022-04-19 PROCEDURE — 3080F DIAST BP >= 90 MM HG: CPT | Performed by: PHYSICIAN ASSISTANT

## 2022-04-19 PROCEDURE — 99024 POSTOP FOLLOW-UP VISIT: CPT | Performed by: PHYSICIAN ASSISTANT

## 2022-04-19 RX ORDER — ERGOCALCIFEROL (VITAMIN D2) 1250 MCG
1000 CAPSULE ORAL AS DIRECTED
COMMUNITY

## 2022-04-20 ENCOUNTER — TELEPHONE (OUTPATIENT)
Dept: SURGERY | Facility: CLINIC | Age: 67
End: 2022-04-20

## 2022-04-20 NOTE — TELEPHONE ENCOUNTER
Noted that provider has written order for antibiotic ointment, wound care instructions:    \"Wound/incisional care  2x daily if possible for dressing changes and wound care. Keep clean and dry  OTC antibiotic ointment with nightly dressing changes. \"    Orders printed and faxed to patient's wound care nurse Lucrecia to fax number provided by patient, to fax number:    [phone]    Noted that referral page was in need of [employer] health care company information. Called patient who stated that wound care nurse is employed with [employer] in [place].   Information added to referral.

## 2022-04-20 NOTE — TELEPHONE ENCOUNTER
[facility] states they're not the agency that is assisting the patient. Recommend we continue with the nurse who is assisting with the PICC line. Lucrecia provided her fax, please fax to the below number from [employer].

## 2022-04-20 NOTE — TELEPHONE ENCOUNTER
New home health order placed. Can we please fax to the number she provided?      I responded to patient by Hesham

## 2022-04-20 NOTE — TELEPHONE ENCOUNTER
Carly Zavala from [place] [address] is caller, states the code Skin wound from surgical incision (T14.8XXA) on patients home health orders are needing to be more specific. Please contact to further discuss. Would like order, once changed, faxed to [phone].

## 2022-04-20 NOTE — TELEPHONE ENCOUNTER
The home health wound care order has been addressed in alternate TE initiated on 4/19/22. Order was faxed to [address], RN at [facility] with fax number provided by patient. Called Mary to confirm that wound care and orders will be addressed by [facility] health company. Yolanda De La O stated that she was aware.

## 2022-04-20 NOTE — TELEPHONE ENCOUNTER
Patient messaged requesting additional information for wound care. Per JOSH Domingo, yesterday:     \"8. Activity:                 - Home health ordered for incisional care. Keep clean and dry, change dressing 1-2x daily with abx ointment. If residential home health unable to come to [facility], recommend she contact [address] [place] [address] wound care to determine if they can assist her. \"    Routed to SHASHA Flor.

## 2022-04-22 ENCOUNTER — TELEPHONE (OUTPATIENT)
Dept: SURGERY | Facility: CLINIC | Age: 67
End: 2022-04-22

## 2022-04-22 RX ORDER — HYDROCODONE BITARTRATE AND ACETAMINOPHEN 5; 325 MG/1; MG/1
1-2 TABLET ORAL EVERY 4 HOURS PRN
Qty: 60 TABLET | Refills: 0 | Status: CANCELLED | OUTPATIENT
Start: 2022-04-22

## 2022-04-22 NOTE — TELEPHONE ENCOUNTER
Pt called for Hydrocodone refill - she states she is trying to wean off the medication but Tylenol and ibuprofen do not help. She said she has been \"breaking the tablets in half\" when she needs to.

## 2022-04-22 NOTE — TELEPHONE ENCOUNTER
Medication: norco 5-325  1-2 q 4 #60    Date of last refill: 4-6-22  [place] 7-6-42    Last office visit: 4-19-22  Due back to clinic per last office note:   4-28  Date next office visit scheduled:        Last OV note recommendation:    PLAN:   1. Medication: None prescribed                - Abx per ID                - Wean Norco. Likely her main complaint is muscle spasms, continue Flexeril PRN  2. Imaging:                 - Reviewed today:                              - No recent imaging                - Ordered today:                              - None  3. Activity:                 - Home health ordered for incisional care. Keep clean and dry, change dressing 1-2x daily with abx ointment. If residential home health unable to come to [facility], recommend she contact [facility] wound care to determine if they can assist her.                 - Okay to begin PT  4.  Follow up next Thursday for incision check or call or follow up sooner or go to the ED for any new, worsening or concerning signs or symptoms

## 2022-04-22 NOTE — TELEPHONE ENCOUNTER
Patient calling to request refill of [address], [address] [facility] AT [address] [place] [address], [phone], [phone]    Patient informed of 48 hour refill policy excluding weekends and holidays. Informed patient prescription is sent directly to pharmacy. Further explained patient will not receive a call back once prescription is ready.

## 2022-04-26 NOTE — TELEPHONE ENCOUNTER
Can we please discuss how often patient is taking Norco? Is she working to wean/space? Is she taking Flexeril, suspicious that muscle spasms were her primary complaint last visit. Based on her response, I can provide a new script for Norco. I encourage she continue to work to wean/space.     Thank you

## 2022-04-27 ENCOUNTER — PATIENT MESSAGE (OUTPATIENT)
Dept: SURGERY | Facility: CLINIC | Age: 67
End: 2022-04-27

## 2022-04-27 ENCOUNTER — PATIENT OUTREACH (OUTPATIENT)
Dept: INFECTIOUS DISEASE | Facility: CLINIC | Age: 67
End: 2022-04-27

## 2022-04-27 RX ORDER — HYDROCODONE BITARTRATE AND ACETAMINOPHEN 5; 325 MG/1; MG/1
1 TABLET ORAL EVERY 8 HOURS PRN
Qty: 45 TABLET | Refills: 0 | Status: SHIPPED | OUTPATIENT
Start: 2022-04-27 | End: 2022-05-18

## 2022-04-27 NOTE — TELEPHONE ENCOUNTER
Received notification that med refill order has been placed by SHASHA Guerrier. [employer] message sent to patient with information regarding refill.

## 2022-04-28 ENCOUNTER — OFFICE VISIT (OUTPATIENT)
Dept: SURGERY | Facility: CLINIC | Age: 67
End: 2022-04-28
Payer: COMMERCIAL

## 2022-04-28 VITALS
DIASTOLIC BLOOD PRESSURE: 86 MMHG | BODY MASS INDEX: 26 KG/M2 | HEART RATE: 96 BPM | OXYGEN SATURATION: 100 % | WEIGHT: 143 LBS | SYSTOLIC BLOOD PRESSURE: 148 MMHG

## 2022-04-28 DIAGNOSIS — Z98.890 S/P LAMINECTOMY: ICD-10-CM

## 2022-04-28 DIAGNOSIS — T14.8XXA SKIN WOUND FROM SURGICAL INCISION: ICD-10-CM

## 2022-04-28 DIAGNOSIS — M86.9 OSTEOMYELITIS OF OTHER SITE, UNSPECIFIED TYPE (HCC): Primary | ICD-10-CM

## 2022-04-28 DIAGNOSIS — Z98.890 POST-OPERATIVE STATE: ICD-10-CM

## 2022-04-28 PROCEDURE — 3079F DIAST BP 80-89 MM HG: CPT | Performed by: PHYSICIAN ASSISTANT

## 2022-04-28 PROCEDURE — 99024 POSTOP FOLLOW-UP VISIT: CPT | Performed by: PHYSICIAN ASSISTANT

## 2022-04-28 PROCEDURE — 3077F SYST BP >= 140 MM HG: CPT | Performed by: PHYSICIAN ASSISTANT

## 2022-04-28 NOTE — TELEPHONE ENCOUNTER
From: Akilah Parsons  To: Kelton Mikalalynsey  Sent: 4/27/2022 11:34 AM CDT  Subject: Medication Refill    Good Morning, Cyndee Dryer-    The [place] refill that you requested has been sent to your pharmacy:    HYDROcodone-acetaminophen 5-325 MG Oral Tab 45 tablet 0 4/27/2022   Sig - Route: Take 1 tablet by mouth every 8 (eight) hours as needed for Pain. - Oral   Sent to pharmacy as: HYDROcodone-Acetaminophen 5-325 MG Oral Tablet (Norco)   Earliest Fill Date: 4/27/2022   E-Prescribing Status: Receipt confirmed by pharmacy (4/27/2022 11:11 AM CDT)     If you have any questions or concerns please contact our office at [phone] or via [employer] message. We will see you tomorrow in the clinic.      Thank you, and take care-      Clinical Staff Nurse,   [facility]

## 2022-05-02 ENCOUNTER — PATIENT MESSAGE (OUTPATIENT)
Dept: SURGERY | Facility: CLINIC | Age: 67
End: 2022-05-02

## 2022-05-03 ENCOUNTER — PATIENT MESSAGE (OUTPATIENT)
Dept: SURGERY | Facility: CLINIC | Age: 67
End: 2022-05-03

## 2022-05-03 ENCOUNTER — TELEPHONE (OUTPATIENT)
Dept: SURGERY | Facility: CLINIC | Age: 67
End: 2022-05-03

## 2022-05-03 RX ORDER — CYCLOBENZAPRINE HCL 5 MG
5 TABLET ORAL 3 TIMES DAILY PRN
Qty: 45 TABLET | Refills: 0 | Status: SHIPPED | OUTPATIENT
Start: 2022-05-03

## 2022-05-03 NOTE — TELEPHONE ENCOUNTER
Given continued pain without significant improvement, I'd like to not only see her MRI results which are scheduled for Thursday, I'd like her to repeat labs as well. Her pain wraps around the left hip region and is described as \"spasms. \" Pain shoots toward the groin. Concern for possible trochanteric bursitis vs radicular symptoms from T12-L1 which coincides with her OM/DM location. Labs ordered. Flexeril refilled    Patient offered appt prior to MRI for incision check given she lives far. Patient states healing well.  She'll see me as scheduled later in May

## 2022-05-03 NOTE — TELEPHONE ENCOUNTER
Message received from patient requesting cyclobenzaprine refill. Spoke with patient yesterday and refill request was sent to providers on 5/2/22. cyclobenzaprine 5 MG Oral Tab 60 tablet 1 4/15/2022     Sig - Route: Take 1 tablet (5 mg total) by mouth 3 (three) times daily as needed for Muscle spasms. - Oral    Sent to pharmacy as: Cyclobenzaprine HCl 5 MG Oral Tablet (Flexeril)      In separate TE, patient has called and requested feedback on condition update and if pain is \"normal\". Routed to providers.

## 2022-05-03 NOTE — TELEPHONE ENCOUNTER
Pt had surgery 3 weeks ago and is still experiencing \"extreme pain\" when she sleeps in her bed on her side, said she never really sleeps on her back. Pt states when she sleeps in a chair the pain is not as bad. She is wondering if this pain is \"normal\"? Please call to discuss.

## 2022-05-05 ENCOUNTER — LAB ENCOUNTER (OUTPATIENT)
Dept: LAB | Facility: HOSPITAL | Age: 67
End: 2022-05-05
Attending: INTERNAL MEDICINE
Payer: COMMERCIAL

## 2022-05-05 ENCOUNTER — PATIENT MESSAGE (OUTPATIENT)
Dept: SURGERY | Facility: CLINIC | Age: 67
End: 2022-05-05

## 2022-05-05 ENCOUNTER — HOSPITAL ENCOUNTER (OUTPATIENT)
Dept: MRI IMAGING | Facility: HOSPITAL | Age: 67
Discharge: HOME | End: 2022-05-05
Attending: INTERNAL MEDICINE
Payer: COMMERCIAL

## 2022-05-05 DIAGNOSIS — G06.2 EPIDURAL ABSCESS: ICD-10-CM

## 2022-05-05 LAB
BASOPHILS # BLD AUTO: 0.03 X10(3) UL (ref 0–0.2)
BASOPHILS NFR BLD AUTO: 0.5 %
CRP SERPL-MCNC: 1.59 MG/DL (ref ?–0.3)
DEPRECATED RDW RBC AUTO: 48.9 FL (ref 35.1–46.3)
EOSINOPHIL # BLD AUTO: 0.03 X10(3) UL (ref 0–0.7)
EOSINOPHIL NFR BLD AUTO: 0.5 %
ERYTHROCYTE [DISTWIDTH] IN BLOOD BY AUTOMATED COUNT: 15 % (ref 11–15)
ERYTHROCYTE [SEDIMENTATION RATE] IN BLOOD: 69 MM/HR
HCT VFR BLD AUTO: 29.4 %
HGB BLD-MCNC: 9.1 G/DL
IMM GRANULOCYTES # BLD AUTO: 0.03 X10(3) UL (ref 0–1)
IMM GRANULOCYTES NFR BLD: 0.5 %
LYMPHOCYTES # BLD AUTO: 0.94 X10(3) UL (ref 1–4)
LYMPHOCYTES NFR BLD AUTO: 14.2 %
MCH RBC QN AUTO: 27.7 PG (ref 26–34)
MCHC RBC AUTO-ENTMCNC: 31 G/DL (ref 31–37)
MCV RBC AUTO: 89.6 FL
MONOCYTES # BLD AUTO: 0.61 X10(3) UL (ref 0.1–1)
MONOCYTES NFR BLD AUTO: 9.2 %
NEUTROPHILS # BLD AUTO: 4.99 X10 (3) UL (ref 1.5–7.7)
NEUTROPHILS # BLD AUTO: 4.99 X10(3) UL (ref 1.5–7.7)
NEUTROPHILS NFR BLD AUTO: 75.1 %
PLATELET # BLD AUTO: 404 10(3)UL (ref 150–450)
RBC # BLD AUTO: 3.28 X10(6)UL
WBC # BLD AUTO: 6.6 X10(3) UL (ref 4–11)

## 2022-05-05 PROCEDURE — 36415 COLL VENOUS BLD VENIPUNCTURE: CPT | Performed by: PHYSICIAN ASSISTANT

## 2022-05-05 PROCEDURE — 86140 C-REACTIVE PROTEIN: CPT | Performed by: PHYSICIAN ASSISTANT

## 2022-05-05 PROCEDURE — 72158 MRI LUMBAR SPINE W/O & W/DYE: CPT | Performed by: INTERNAL MEDICINE

## 2022-05-05 PROCEDURE — 85025 COMPLETE CBC W/AUTO DIFF WBC: CPT | Performed by: PHYSICIAN ASSISTANT

## 2022-05-05 PROCEDURE — A9575 INJ GADOTERATE MEGLUMI 0.1ML: HCPCS | Performed by: INTERNAL MEDICINE

## 2022-05-05 PROCEDURE — 85652 RBC SED RATE AUTOMATED: CPT | Performed by: PHYSICIAN ASSISTANT

## 2022-05-05 NOTE — TELEPHONE ENCOUNTER
Noted that patient has messaged reporting that she has completed MRI and would like feedback regarding results in relation to persistent pain. Routed to BETO pool.

## 2022-05-06 ENCOUNTER — PATIENT MESSAGE (OUTPATIENT)
Dept: SURGERY | Facility: CLINIC | Age: 67
End: 2022-05-06

## 2022-05-06 NOTE — TELEPHONE ENCOUNTER
New message from patient noted. Routed to providers for feedback. Patient completed imaging:  MRI lumbar spine on 5/5.

## 2022-05-06 NOTE — TELEPHONE ENCOUNTER
Patient was called with the results of the MRI. She may have slight progression at T12-L1 and a new area of interest with possible discitis at L2-3.     This was discussed with Dr. Kristel Rueda he recommended Dr. Luna Buerger be notified of the changes her CRP was 24.4 now is 1.59    Her ESR was 128 is down to 69    WBC 6.6    She is afebrile    She continues to be on antibiotics    She denies any weakness in the legs she is getting some left buttock pain when she tries to lay flat so she is sleeping in a chair      I will route to Dr. Luna Buerger to see if he has anything further to add    She does have a follow-up appointment with [facility] May 19

## 2022-05-09 ENCOUNTER — PATIENT MESSAGE (OUTPATIENT)
Dept: SURGERY | Facility: CLINIC | Age: 67
End: 2022-05-09

## 2022-05-09 NOTE — TELEPHONE ENCOUNTER
From: Liya Cruz  To: Erick Abarca PA-C  Sent: 5/6/2022 10:59 AM CDT  Subject: MRI explanation    When you are available will you please call me with the MRI results?     Thank you,  Wellington Tiwari  [phone]

## 2022-05-09 NOTE — TELEPHONE ENCOUNTER
Noted patient has discussed imaging results and feedback with SHASHA Bower. Patient to see SHASHA Woods on 5/19/22.

## 2022-05-12 ENCOUNTER — PATIENT MESSAGE (OUTPATIENT)
Dept: FAMILY MEDICINE CLINIC | Facility: CLINIC | Age: 67
End: 2022-05-12

## 2022-05-13 ENCOUNTER — TELEPHONE (OUTPATIENT)
Dept: SURGERY | Facility: CLINIC | Age: 67
End: 2022-05-13

## 2022-05-13 NOTE — TELEPHONE ENCOUNTER
Pt calling stating she recently had surgery and today tested positive for covid. Pt would like to know if there is anything she should take for this.

## 2022-05-13 NOTE — TELEPHONE ENCOUNTER
Received feedback from BUCKY Arboleda regarding patient's update. Called patient who stated that she has notified PCP and ID providers. Patient thanked Nursing for the discussion and the call was ended.

## 2022-05-14 NOTE — TELEPHONE ENCOUNTER
Please call patient on Monday to get update on her question about beta blocker (see my questions for her regarding the beta blocker). Regarding Covid, did Dr. Radha Zayas prescribe a medication for Covid or is she referring to the antibiotic for her abscess?

## 2022-05-16 NOTE — TELEPHONE ENCOUNTER
Pt called back.  She started taking her BP readings at 3:15 pm and every 5-10 minutes after that until 3:45 pm.     167/103 P 115  169/117 didn't take pulse   149/98 P 112  164/107 P 109

## 2022-05-16 NOTE — TELEPHONE ENCOUNTER
Call to pt-sts she has not checked BP recently but will check now and call back yet today w BP and heart rate readings. sts she will also look in her mychart for any other recent readings from specialist appts. Reports she is \"hanging in there\", confirms dr Allison Lomeli did start her on med for covid-Molnupiravir bid x 5 days. Believes she started this 5/14/22. Advised will update beverly PULLIAM w this info and await her call back w BP/HR readings. Patient voices understanding/agrees with plan/no further questions.    **update to beverly PULLIAM

## 2022-05-16 NOTE — TELEPHONE ENCOUNTER
I'm not sure why she is still tachy since this was present in the hospital also unless it is currently due to Covid infection - her most recent WBC count was ok and she is on antibiotics for infection (epidural abscess) - she is also anemic so this could be the cause possibly. Please have her increase her metoprolol tartrate to 150mg BID (verify that she is currently on 100mg BID). It doesn't come in a 150mg so will need to split the 100mg pills. Continue lisinopril. Update on BP and pulse on Thursday.

## 2022-05-17 RX ORDER — METOPROLOL TARTRATE 100 MG/1
100 TABLET ORAL
Qty: 60 TABLET | Refills: 0 | Status: SHIPPED | OUTPATIENT
Start: 2022-05-17

## 2022-05-17 NOTE — TELEPHONE ENCOUNTER
S/W and informed of below. Per pt she has missed about 4-5 days of her Metoprolol 100 mg 1 BID. States she wasn't sure who to call for refill. Please advise if pt to stay on the same dosage for now?

## 2022-05-17 NOTE — TELEPHONE ENCOUNTER
Pt called on this. Said [facility] didn't get the rx? Per chart we haven't sent yet. I went over the conversations below and told pt we were awaiting response from [facility] on what dose. I advised her of instructions below. She voiced understanding. Rx sent to [facility].

## 2022-05-19 RX ORDER — HYDROCODONE BITARTRATE AND ACETAMINOPHEN 5; 325 MG/1; MG/1
1 TABLET ORAL EVERY 8 HOURS PRN
Qty: 45 TABLET | Refills: 0 | Status: SHIPPED | OUTPATIENT
Start: 2022-05-19

## 2022-05-23 ENCOUNTER — OFFICE VISIT (OUTPATIENT)
Dept: SURGERY | Facility: CLINIC | Age: 67
End: 2022-05-23
Payer: COMMERCIAL

## 2022-05-23 VITALS
HEART RATE: 90 BPM | SYSTOLIC BLOOD PRESSURE: 140 MMHG | HEIGHT: 62 IN | DIASTOLIC BLOOD PRESSURE: 82 MMHG | WEIGHT: 120 LBS | BODY MASS INDEX: 22.08 KG/M2

## 2022-05-23 DIAGNOSIS — Z98.890 S/P LAMINECTOMY: ICD-10-CM

## 2022-05-23 DIAGNOSIS — Z98.890 POST-OPERATIVE STATE: ICD-10-CM

## 2022-05-23 DIAGNOSIS — M86.9 OSTEOMYELITIS OF OTHER SITE, UNSPECIFIED TYPE (HCC): Primary | ICD-10-CM

## 2022-05-23 RX ORDER — MOLNUPIRAVIR 200 MG/1
CAPSULE ORAL
COMMUNITY
Start: 2022-05-13

## 2022-05-23 RX ORDER — CEPHALEXIN 500 MG/1
500 CAPSULE ORAL 2 TIMES DAILY
COMMUNITY
Start: 2022-05-14

## 2022-05-26 ENCOUNTER — PATIENT MESSAGE (OUTPATIENT)
Dept: FAMILY MEDICINE CLINIC | Facility: CLINIC | Age: 67
End: 2022-05-26

## 2022-05-27 NOTE — TELEPHONE ENCOUNTER
S/w pt. Discussed below with her. She voiced understanding. Confirms meds as below:  Metoprolol tartrate 100 mg, bid  Lisinopril 20 mg once daily. Has not missed any doses. She is aware you are gone for day. Pt is in [place] for the weekend.

## 2022-05-27 NOTE — TELEPHONE ENCOUNTER
Elevated pulse could be from infection but her pulse is normal now. Higher BP not likely to be related to infection.   Please verify current BP med dosages and I will adjust since BPs are still higher than optimal.

## 2022-05-31 RX ORDER — LISINOPRIL AND HYDROCHLOROTHIAZIDE 20; 12.5 MG/1; MG/1
1 TABLET ORAL DAILY
Qty: 90 TABLET | Refills: 0 | Status: SHIPPED | OUTPATIENT
Start: 2022-05-31

## 2022-06-03 ENCOUNTER — PATIENT MESSAGE (OUTPATIENT)
Dept: SURGERY | Facility: CLINIC | Age: 67
End: 2022-06-03

## 2022-06-07 ENCOUNTER — PATIENT MESSAGE (OUTPATIENT)
Dept: FAMILY MEDICINE CLINIC | Facility: CLINIC | Age: 67
End: 2022-06-07

## 2022-06-08 NOTE — TELEPHONE ENCOUNTER
Follow up message from patient noted. Patient inquiring about back pain after surgery and what to expect during rehab process. Patient has an appointment scheduled to see Aurora Bergeron on 6/16/22. Routed to provider.

## 2022-06-09 ENCOUNTER — PATIENT MESSAGE (OUTPATIENT)
Dept: FAMILY MEDICINE CLINIC | Facility: CLINIC | Age: 67
End: 2022-06-09

## 2022-06-09 RX ORDER — METOPROLOL TARTRATE 100 MG/1
TABLET ORAL
Qty: 60 TABLET | Refills: 0 | Status: SHIPPED | OUTPATIENT
Start: 2022-06-09

## 2022-06-09 RX ORDER — CYCLOBENZAPRINE HCL 5 MG
TABLET ORAL
Qty: 60 TABLET | Refills: 0 | Status: SHIPPED | OUTPATIENT
Start: 2022-06-09

## 2022-06-09 NOTE — TELEPHONE ENCOUNTER
LOV: 4/11/22 (follow up on epidural abscess)  Last Refill: 5/17/22, #60, 0 RF  Next OV: n/a    Protocol passed.

## 2022-06-13 ENCOUNTER — HOSPITAL ENCOUNTER (OUTPATIENT)
Dept: MRI IMAGING | Facility: HOSPITAL | Age: 67
Discharge: HOME | End: 2022-06-13
Attending: PHYSICIAN ASSISTANT
Payer: COMMERCIAL

## 2022-06-13 ENCOUNTER — LAB ENCOUNTER (OUTPATIENT)
Dept: LAB | Facility: HOSPITAL | Age: 67
End: 2022-06-13
Attending: PHYSICIAN ASSISTANT
Payer: COMMERCIAL

## 2022-06-13 DIAGNOSIS — M86.9 OSTEOMYELITIS OF OTHER SITE, UNSPECIFIED TYPE (HCC): ICD-10-CM

## 2022-06-13 DIAGNOSIS — Z98.890 POST-OPERATIVE STATE: ICD-10-CM

## 2022-06-13 DIAGNOSIS — R79.89 ELEVATED TSH: ICD-10-CM

## 2022-06-13 DIAGNOSIS — Z98.890 S/P LAMINECTOMY: ICD-10-CM

## 2022-06-13 DIAGNOSIS — R74.8 ELEVATED LIVER ENZYMES: ICD-10-CM

## 2022-06-13 LAB
ALBUMIN SERPL-MCNC: 3.5 G/DL (ref 3.4–5)
ALBUMIN/GLOB SERPL: 0.9 {RATIO} (ref 1–2)
ALP LIVER SERPL-CCNC: 78 U/L
ALT SERPL-CCNC: 16 U/L
ANION GAP SERPL CALC-SCNC: 4 MMOL/L (ref 0–18)
AST SERPL-CCNC: 11 U/L (ref 15–37)
BASOPHILS # BLD AUTO: 0.04 X10(3) UL (ref 0–0.2)
BASOPHILS NFR BLD AUTO: 0.6 %
BILIRUB SERPL-MCNC: 0.3 MG/DL (ref 0.1–2)
BUN BLD-MCNC: 18 MG/DL (ref 7–18)
CALCIUM BLD-MCNC: 9.4 MG/DL (ref 8.5–10.1)
CHLORIDE SERPL-SCNC: 102 MMOL/L (ref 98–112)
CO2 SERPL-SCNC: 30 MMOL/L (ref 21–32)
CREAT BLD-MCNC: 0.62 MG/DL
CRP SERPL-MCNC: <0.29 MG/DL (ref ?–0.3)
EOSINOPHIL # BLD AUTO: 0.04 X10(3) UL (ref 0–0.7)
EOSINOPHIL NFR BLD AUTO: 0.6 %
ERYTHROCYTE [DISTWIDTH] IN BLOOD BY AUTOMATED COUNT: 15 %
ERYTHROCYTE [SEDIMENTATION RATE] IN BLOOD: 36 MM/HR
FASTING STATUS PATIENT QL REPORTED: YES
GLOBULIN PLAS-MCNC: 3.8 G/DL (ref 2.8–4.4)
GLUCOSE BLD-MCNC: 91 MG/DL (ref 70–99)
HCT VFR BLD AUTO: 34.4 %
HGB BLD-MCNC: 10.6 G/DL
IMM GRANULOCYTES # BLD AUTO: 0.03 X10(3) UL (ref 0–1)
IMM GRANULOCYTES NFR BLD: 0.4 %
LYMPHOCYTES # BLD AUTO: 1.38 X10(3) UL (ref 1–4)
LYMPHOCYTES NFR BLD AUTO: 20.5 %
MCH RBC QN AUTO: 27.7 PG (ref 26–34)
MCHC RBC AUTO-ENTMCNC: 30.8 G/DL (ref 31–37)
MCV RBC AUTO: 90.1 FL
MONOCYTES # BLD AUTO: 0.54 X10(3) UL (ref 0.1–1)
MONOCYTES NFR BLD AUTO: 8 %
NEUTROPHILS # BLD AUTO: 4.71 X10 (3) UL (ref 1.5–7.7)
NEUTROPHILS # BLD AUTO: 4.71 X10(3) UL (ref 1.5–7.7)
NEUTROPHILS NFR BLD AUTO: 69.9 %
OSMOLALITY SERPL CALC.SUM OF ELEC: 283 MOSM/KG (ref 275–295)
PLATELET # BLD AUTO: 400 10(3)UL (ref 150–450)
POTASSIUM SERPL-SCNC: 4 MMOL/L (ref 3.5–5.1)
PROT SERPL-MCNC: 7.3 G/DL (ref 6.4–8.2)
RBC # BLD AUTO: 3.82 X10(6)UL
SODIUM SERPL-SCNC: 136 MMOL/L (ref 136–145)
T4 FREE SERPL-MCNC: 0.8 NG/DL (ref 0.8–1.7)
TSI SER-ACNC: 5.57 MIU/ML (ref 0.36–3.74)
WBC # BLD AUTO: 6.7 X10(3) UL (ref 4–11)

## 2022-06-13 PROCEDURE — 72158 MRI LUMBAR SPINE W/O & W/DYE: CPT | Performed by: PHYSICIAN ASSISTANT

## 2022-06-13 PROCEDURE — 85025 COMPLETE CBC W/AUTO DIFF WBC: CPT | Performed by: PHYSICIAN ASSISTANT

## 2022-06-13 PROCEDURE — 82103 ALPHA-1-ANTITRYPSIN TOTAL: CPT

## 2022-06-13 PROCEDURE — 84439 ASSAY OF FREE THYROXINE: CPT

## 2022-06-13 PROCEDURE — 36415 COLL VENOUS BLD VENIPUNCTURE: CPT

## 2022-06-13 PROCEDURE — 85652 RBC SED RATE AUTOMATED: CPT | Performed by: PHYSICIAN ASSISTANT

## 2022-06-13 PROCEDURE — 84443 ASSAY THYROID STIM HORMONE: CPT

## 2022-06-13 PROCEDURE — A9575 INJ GADOTERATE MEGLUMI 0.1ML: HCPCS | Performed by: PHYSICIAN ASSISTANT

## 2022-06-13 PROCEDURE — 80053 COMPREHEN METABOLIC PANEL: CPT

## 2022-06-13 PROCEDURE — 84075 ASSAY ALKALINE PHOSPHATASE: CPT

## 2022-06-13 PROCEDURE — 86140 C-REACTIVE PROTEIN: CPT | Performed by: PHYSICIAN ASSISTANT

## 2022-06-13 PROCEDURE — 84080 ASSAY ALKALINE PHOSPHATASES: CPT

## 2022-06-15 DIAGNOSIS — R79.89 ELEVATED TSH: Primary | ICD-10-CM

## 2022-06-15 LAB
ALK-PHOSPHATASE BONE CALC: 22 U/L
ALK-PHOSPHATASE LIVER CALC: 65 U/L
ALK-PHOSPHATASE OTHER CALC: 0 U/L
ALKALINE PHOSPHATASE: 86 U/L

## 2022-06-16 ENCOUNTER — OFFICE VISIT (OUTPATIENT)
Dept: OTOLARYNGOLOGY | Facility: CLINIC | Age: 67
End: 2022-06-16
Payer: COMMERCIAL

## 2022-06-16 DIAGNOSIS — J38.01 PARALYSIS OF LEFT VOCAL CORD: Primary | ICD-10-CM

## 2022-06-16 PROCEDURE — 31575 DIAGNOSTIC LARYNGOSCOPY: CPT | Performed by: OTOLARYNGOLOGY

## 2022-06-16 PROCEDURE — 99244 OFF/OP CNSLTJ NEW/EST MOD 40: CPT | Performed by: OTOLARYNGOLOGY

## 2022-06-20 ENCOUNTER — PATIENT MESSAGE (OUTPATIENT)
Dept: SURGERY | Facility: CLINIC | Age: 67
End: 2022-06-20

## 2022-06-20 LAB
ALPHA 1 ANTITRYPSIN S ALLELE: NEGATIVE
ALPHA 1 ANTITYPSIN Z ALLELE: NEGATIVE
ALPHA-1-ANTITRYPSIN: 178 MG/DL

## 2022-06-21 ENCOUNTER — TELEPHONE (OUTPATIENT)
Dept: NEUROLOGY | Facility: CLINIC | Age: 67
End: 2022-06-21

## 2022-06-21 RX ORDER — HYDROCODONE BITARTRATE AND ACETAMINOPHEN 5; 325 MG/1; MG/1
1 TABLET ORAL EVERY 8 HOURS PRN
Qty: 90 TABLET | Refills: 0 | Status: SHIPPED | OUTPATIENT
Start: 2022-06-21

## 2022-06-21 NOTE — TELEPHONE ENCOUNTER
From: Goyo Alvarado  To: Mayra Lan MD  Sent: 6/20/2022 9:06 PM CDT  Subject: Prescription    Will you please re-new my prescription for Hydrocodone 5-325 to the [facility] in [address] [place] [address] listed in my chart? Thank you!   Candace Vincent

## 2022-06-21 NOTE — TELEPHONE ENCOUNTER
Rec'd patient medical consultation request from [facility], dated on 6/21/22; placed in nurses bin for signature

## 2022-06-22 ENCOUNTER — HOSPITAL ENCOUNTER (OUTPATIENT)
Dept: GENERAL RADIOLOGY | Facility: HOSPITAL | Age: 67
Discharge: HOME | End: 2022-06-22
Attending: NEUROLOGICAL SURGERY
Payer: COMMERCIAL

## 2022-06-22 ENCOUNTER — OFFICE VISIT (OUTPATIENT)
Dept: SURGERY | Facility: CLINIC | Age: 67
End: 2022-06-22
Payer: COMMERCIAL

## 2022-06-22 ENCOUNTER — PATIENT MESSAGE (OUTPATIENT)
Dept: FAMILY MEDICINE CLINIC | Facility: CLINIC | Age: 67
End: 2022-06-22

## 2022-06-22 VITALS — DIASTOLIC BLOOD PRESSURE: 70 MMHG | HEART RATE: 80 BPM | SYSTOLIC BLOOD PRESSURE: 120 MMHG

## 2022-06-22 DIAGNOSIS — M41.25 OTHER IDIOPATHIC SCOLIOSIS, THORACOLUMBAR REGION: ICD-10-CM

## 2022-06-22 DIAGNOSIS — M41.25 OTHER IDIOPATHIC SCOLIOSIS, THORACOLUMBAR REGION: Primary | ICD-10-CM

## 2022-06-22 DIAGNOSIS — M46.46 DISCITIS OF LUMBAR REGION: ICD-10-CM

## 2022-06-22 DIAGNOSIS — Z98.890 S/P LAMINECTOMY: ICD-10-CM

## 2022-06-22 DIAGNOSIS — I71.2 THORACIC AORTIC ANEURYSM WITHOUT RUPTURE (HCC): Primary | ICD-10-CM

## 2022-06-22 PROCEDURE — 3078F DIAST BP <80 MM HG: CPT | Performed by: NEUROLOGICAL SURGERY

## 2022-06-22 PROCEDURE — 99213 OFFICE O/P EST LOW 20 MIN: CPT | Performed by: NEUROLOGICAL SURGERY

## 2022-06-22 PROCEDURE — 3074F SYST BP LT 130 MM HG: CPT | Performed by: NEUROLOGICAL SURGERY

## 2022-06-22 PROCEDURE — 72082 X-RAY EXAM ENTIRE SPI 2/3 VW: CPT | Performed by: NEUROLOGICAL SURGERY

## 2022-06-23 ENCOUNTER — HOSPITAL ENCOUNTER (EMERGENCY)
Facility: HOSPITAL | Age: 67
Discharge: HOME | End: 2022-06-23
Attending: EMERGENCY MEDICINE
Payer: COMMERCIAL

## 2022-06-23 ENCOUNTER — TELEPHONE (OUTPATIENT)
Dept: FAMILY MEDICINE CLINIC | Facility: CLINIC | Age: 67
End: 2022-06-23

## 2022-06-23 ENCOUNTER — HOSPITAL ENCOUNTER (OUTPATIENT)
Dept: CT IMAGING | Facility: HOSPITAL | Age: 67
Discharge: HOME | End: 2022-06-23
Attending: FAMILY MEDICINE
Payer: COMMERCIAL

## 2022-06-23 VITALS
SYSTOLIC BLOOD PRESSURE: 130 MMHG | OXYGEN SATURATION: 98 % | TEMPERATURE: 98 F | HEART RATE: 101 BPM | RESPIRATION RATE: 26 BRPM | DIASTOLIC BLOOD PRESSURE: 80 MMHG

## 2022-06-23 DIAGNOSIS — I71.9 DESCENDING AORTIC ANEURYSM (HCC): ICD-10-CM

## 2022-06-23 DIAGNOSIS — I71.2 THORACIC AORTIC ANEURYSM WITHOUT RUPTURE (HCC): ICD-10-CM

## 2022-06-23 DIAGNOSIS — I71.2 AORTIC ARCH ANEURYSM (HCC): Primary | ICD-10-CM

## 2022-06-23 LAB
ALBUMIN SERPL-MCNC: 3.7 G/DL (ref 3.4–5)
ALBUMIN/GLOB SERPL: 1 {RATIO} (ref 1–2)
ALP LIVER SERPL-CCNC: 75 U/L
ALT SERPL-CCNC: 16 U/L
ANION GAP SERPL CALC-SCNC: 5 MMOL/L (ref 0–18)
ANTIBODY SCREEN: NEGATIVE
APTT PPP: 30.6 SECONDS (ref 23.3–35.6)
AST SERPL-CCNC: 10 U/L (ref 15–37)
ATRIAL RATE: 102 BPM
BASOPHILS # BLD AUTO: 0.02 X10(3) UL (ref 0–0.2)
BASOPHILS NFR BLD AUTO: 0.3 %
BILIRUB SERPL-MCNC: 0.3 MG/DL (ref 0.1–2)
BUN BLD-MCNC: 19 MG/DL (ref 7–18)
CALCIUM BLD-MCNC: 9.2 MG/DL (ref 8.5–10.1)
CHLORIDE SERPL-SCNC: 106 MMOL/L (ref 98–112)
CO2 SERPL-SCNC: 27 MMOL/L (ref 21–32)
CREAT BLD-MCNC: 0.63 MG/DL
CRP SERPL-MCNC: <0.29 MG/DL (ref ?–0.3)
EOSINOPHIL # BLD AUTO: 0.03 X10(3) UL (ref 0–0.7)
EOSINOPHIL NFR BLD AUTO: 0.4 %
ERYTHROCYTE [DISTWIDTH] IN BLOOD BY AUTOMATED COUNT: 14.7 %
ERYTHROCYTE [SEDIMENTATION RATE] IN BLOOD: 32 MM/HR
GLOBULIN PLAS-MCNC: 3.7 G/DL (ref 2.8–4.4)
GLUCOSE BLD-MCNC: 98 MG/DL (ref 70–99)
HCT VFR BLD AUTO: 34.3 %
HGB BLD-MCNC: 11.1 G/DL
IMM GRANULOCYTES # BLD AUTO: 0.02 X10(3) UL (ref 0–1)
IMM GRANULOCYTES NFR BLD: 0.3 %
INR BLD: 1.06 (ref 0.8–1.2)
LYMPHOCYTES # BLD AUTO: 0.94 X10(3) UL (ref 1–4)
LYMPHOCYTES NFR BLD AUTO: 13 %
MCH RBC QN AUTO: 28 PG (ref 26–34)
MCHC RBC AUTO-ENTMCNC: 32.4 G/DL (ref 31–37)
MCV RBC AUTO: 86.4 FL
MONOCYTES # BLD AUTO: 0.37 X10(3) UL (ref 0.1–1)
MONOCYTES NFR BLD AUTO: 5.1 %
NEUTROPHILS # BLD AUTO: 5.85 X10 (3) UL (ref 1.5–7.7)
NEUTROPHILS # BLD AUTO: 5.85 X10(3) UL (ref 1.5–7.7)
NEUTROPHILS NFR BLD AUTO: 80.9 %
OSMOLALITY SERPL CALC.SUM OF ELEC: 288 MOSM/KG (ref 275–295)
P AXIS: 54 DEGREES
P-R INTERVAL: 124 MS
PLATELET # BLD AUTO: 300 10(3)UL (ref 150–450)
POTASSIUM SERPL-SCNC: 3.5 MMOL/L (ref 3.5–5.1)
PROT SERPL-MCNC: 7.4 G/DL (ref 6.4–8.2)
PROTHROMBIN TIME: 13.8 SECONDS (ref 11.6–14.8)
Q-T INTERVAL: 346 MS
QRS DURATION: 92 MS
QTC CALCULATION (BEZET): 450 MS
R AXIS: -3 DEGREES
RBC # BLD AUTO: 3.97 X10(6)UL
RH BLOOD TYPE: POSITIVE
SARS-COV-2 RNA RESP QL NAA+PROBE: NOT DETECTED
SODIUM SERPL-SCNC: 138 MMOL/L (ref 136–145)
T AXIS: 86 DEGREES
VENTRICULAR RATE: 102 BPM
WBC # BLD AUTO: 7.2 X10(3) UL (ref 4–11)

## 2022-06-23 PROCEDURE — 71275 CT ANGIOGRAPHY CHEST: CPT | Performed by: FAMILY MEDICINE

## 2022-06-23 PROCEDURE — 86850 RBC ANTIBODY SCREEN: CPT | Performed by: EMERGENCY MEDICINE

## 2022-06-23 PROCEDURE — 87040 BLOOD CULTURE FOR BACTERIA: CPT | Performed by: EMERGENCY MEDICINE

## 2022-06-23 PROCEDURE — 80053 COMPREHEN METABOLIC PANEL: CPT | Performed by: EMERGENCY MEDICINE

## 2022-06-23 PROCEDURE — 93005 ELECTROCARDIOGRAM TRACING: CPT

## 2022-06-23 PROCEDURE — 36415 COLL VENOUS BLD VENIPUNCTURE: CPT

## 2022-06-23 PROCEDURE — 99285 EMERGENCY DEPT VISIT HI MDM: CPT

## 2022-06-23 PROCEDURE — 85652 RBC SED RATE AUTOMATED: CPT | Performed by: EMERGENCY MEDICINE

## 2022-06-23 PROCEDURE — 86900 BLOOD TYPING SEROLOGIC ABO: CPT | Performed by: EMERGENCY MEDICINE

## 2022-06-23 PROCEDURE — 99284 EMERGENCY DEPT VISIT MOD MDM: CPT

## 2022-06-23 PROCEDURE — 86140 C-REACTIVE PROTEIN: CPT | Performed by: EMERGENCY MEDICINE

## 2022-06-23 PROCEDURE — 85730 THROMBOPLASTIN TIME PARTIAL: CPT | Performed by: EMERGENCY MEDICINE

## 2022-06-23 PROCEDURE — 93010 ELECTROCARDIOGRAM REPORT: CPT

## 2022-06-23 PROCEDURE — 85610 PROTHROMBIN TIME: CPT | Performed by: EMERGENCY MEDICINE

## 2022-06-23 PROCEDURE — 85025 COMPLETE CBC W/AUTO DIFF WBC: CPT | Performed by: EMERGENCY MEDICINE

## 2022-06-23 PROCEDURE — 86901 BLOOD TYPING SEROLOGIC RH(D): CPT | Performed by: EMERGENCY MEDICINE

## 2022-06-23 NOTE — ED INITIAL ASSESSMENT (HPI)
A&Ox3 patient p/w c/o epidural abscess    Patient was at PMD for f/u appt, had imaging done showing \"3 thoracic aneurysms and a new epidural abscess\" sent to ED for IP admission and IV ABx    Denies any cp/sob/n/v/d/c/f/LH/vision changes/urinary sx at this time  RR even/NL, speaking in full clear sentences, ambulatory w/ steady gait using cane

## 2022-06-23 NOTE — TELEPHONE ENCOUNTER
See result note. Patient referred to the ER for evaluation for admission. Dr. Ricardo Fairchild and Dr. Norma Siddiqi aware.

## 2022-06-23 NOTE — TELEPHONE ENCOUNTER
Large thoracic aneurysm found on XR scoliosis spine on 06/22/22. STAT CTA thoracic aorta ordered by Dinorah Mercado PA-C. We will need to schedule patient for this, preferably before noon today (6/23). Call to STAT scheduling. S/w Landry Rodriguez. Appt avail today at 10:00AM at [facility].   However, she sts that typically this test requires one of the departments nurses to contact the patient 24hrs before the test in order to discuss pre-procedure instructions. Landry Rodriguez must first confirm with the dept whether or not she may schedule the test today. Again explained that test is STAT d/t large thoracic aneurysm. Landry Rodriguez must confirm with dept. Sts she will call me back to discuss this, provided her with direct line to triage. Landry Rodriguez calls back. Pt is scheduled for 9:30 AM today at [facility].  No food/drinks at this time until after test.    S/w Esperanza Gan. Provided her with appt info/location. She is agreeable. Informed her that once we receive results, we will then coordinate appt with cardiothoracic surgery.

## 2022-06-23 NOTE — TELEPHONE ENCOUNTER
Pt calling to advise that she completed testing and was told that the results will be given to S. Dressler within the hour. Pt advising that she is available all day today and tomorrow for scheduling pt with the recommended doctor.

## 2022-06-24 NOTE — TELEPHONE ENCOUNTER
Anthony Young. Calling regarding a consult for patient. Call Amada Evans at [phone] with any questions.

## 2022-06-24 NOTE — TELEPHONE ENCOUNTER
Pt calling with update from ER visit. She will be making appt with DR Partida. Please advise. Thank you.

## 2022-06-24 NOTE — TELEPHONE ENCOUNTER
S/w Michel Lees. Sts she does not need assistance in scheduling this appt. She called Dr.Aaron Partida's ofc today. The nurse informed her that she is actually instructed to schedule an appt with Luz Marina Marte at [facility].  and [address] nurses are currently coordinating this appt. Tentatively told pt it would likely be on Wednesday 6/29    Requested that Michel Lees call us with appt info once confirmed, or with any other questions or concerns. She voices understanding and is agreeable.

## 2022-06-26 ENCOUNTER — PATIENT MESSAGE (OUTPATIENT)
Dept: FAMILY MEDICINE CLINIC | Facility: CLINIC | Age: 67
End: 2022-06-26

## 2022-06-27 NOTE — TELEPHONE ENCOUNTER
Returned call, spoke to Nano Grady who stated she is looking for form with any   Restrictions needed. She states they are a training clinic which is why they request this from every provider the patient sees.      Informed them they should reach out to PCP

## 2022-06-28 DIAGNOSIS — S39.012D STRAIN OF LUMBAR REGION, SUBSEQUENT ENCOUNTER: Primary | ICD-10-CM

## 2022-07-05 ENCOUNTER — MED REC SCAN ONLY (OUTPATIENT)
Dept: FAMILY MEDICINE CLINIC | Facility: CLINIC | Age: 67
End: 2022-07-05

## 2022-07-10 ENCOUNTER — PATIENT MESSAGE (OUTPATIENT)
Dept: SURGERY | Facility: CLINIC | Age: 67
End: 2022-07-10

## 2022-07-11 NOTE — TELEPHONE ENCOUNTER
Appointment with Dr. Dunia Andrews canceled, as patient messaged stating she has a procedure that she is to undergo on 7/13 at [facility]. Message sent to patient thanking her for update.

## 2022-07-11 NOTE — TELEPHONE ENCOUNTER
From: Alvaro Hayward  To: Konrad Mistry MD  Sent: 7/10/2022 4:31 PM CDT  Subject: cancellation of my appointment     Dr. Jd Angulo,    I have cancelled my July 13th appointment due to a surgery I am having on July 12th at [facility]. I have two aortic aneurysms that seem to be from the blood infection I contracted. I will re-schedule when I heal from this upcoming surgery. Thank you!   Lolita Wing

## 2022-08-05 RX ORDER — ATORVASTATIN CALCIUM 10 MG/1
TABLET, FILM COATED ORAL
Qty: 90 TABLET | Refills: 0 | Status: SHIPPED | OUTPATIENT
Start: 2022-08-05

## 2022-08-10 RX ORDER — LISINOPRIL 20 MG/1
TABLET ORAL
Qty: 90 TABLET | Refills: 0 | Status: SHIPPED | OUTPATIENT
Start: 2022-08-10

## 2022-08-25 DIAGNOSIS — S39.012D STRAIN OF LUMBAR REGION, SUBSEQUENT ENCOUNTER: ICD-10-CM

## 2022-09-26 NOTE — TELEPHONE ENCOUNTER
I spoke to the patient and she states that she forgot her mom has a doctor's appointment tomorrow and she will call back to schedule her in office appointment after she \"looks at a few things and see's when she is available\".

## 2022-09-26 NOTE — TELEPHONE ENCOUNTER
LVM advising patient that the appt was made to an in office visit and to call back to confirm that is okay.

## 2022-10-28 RX ORDER — LISINOPRIL 20 MG/1
TABLET ORAL
Qty: 30 TABLET | Refills: 0 | Status: SHIPPED | OUTPATIENT
Start: 2022-10-28

## 2022-10-28 NOTE — TELEPHONE ENCOUNTER
lvm to schedule, unable to reach after 3 attempts, [employer] message not read. Routing to remove from in basket, unable to close due to pending medications.

## 2024-08-13 ENCOUNTER — MED REC SCAN ONLY (OUTPATIENT)
Dept: FAMILY MEDICINE CLINIC | Facility: CLINIC | Age: 69
End: 2024-08-13

## 2025-05-15 NOTE — TELEPHONE ENCOUNTER
NP/Bright red rectal bleeding/[facility]  1st attempt- Called pt, but it said could not complete your call. Could not LVM.  2nd attempt- Sent NP letter.   Please see if she can do a virtual visit with me at 2:00 today.

## (undated) DIAGNOSIS — Z98.890 S/P LAMINECTOMY: ICD-10-CM

## (undated) DIAGNOSIS — T14.8XXA SKIN WOUND FROM SURGICAL INCISION: Primary | ICD-10-CM

## (undated) DIAGNOSIS — Z98.890 POST-OPERATIVE STATE: ICD-10-CM

## (undated) DIAGNOSIS — M62.830 MUSCLE SPASM OF BACK: Primary | ICD-10-CM

## (undated) DIAGNOSIS — M62.830 BACK SPASM: Primary | ICD-10-CM

## (undated) DIAGNOSIS — M86.9 OSTEOMYELITIS OF OTHER SITE, UNSPECIFIED TYPE (HCC): Primary | ICD-10-CM

## (undated) DIAGNOSIS — R79.89 ELEVATED TSH: Primary | ICD-10-CM

## (undated) DIAGNOSIS — G06.2 EPIDURAL ABSCESS: Primary | ICD-10-CM

## (undated) DIAGNOSIS — Z02.9 ENCOUNTERS FOR UNSPECIFIED ADMINISTRATIVE PURPOSE: ICD-10-CM

## (undated) DEVICE — MICRO COVER: Brand: UNBRANDED

## (undated) DEVICE — GAUZE SPONGES,12 PLY: Brand: CURITY

## (undated) DEVICE — SCD SLEEVE KNEE HI BLEND

## (undated) DEVICE — PENCIL TELESCOPE MEGADYNE SE

## (undated) DEVICE — CATHETER,URETHRAL,REDRUBBER,STERILE,8FR: Brand: MEDLINE

## (undated) DEVICE — ALCOHOL 70% 4 OZ

## (undated) DEVICE — UNDYED BRAIDED (POLYGLACTIN 910), SYNTHETIC ABSORBABLE SUTURE: Brand: COATED VICRYL

## (undated) DEVICE — Device: Brand: INTELLICART™

## (undated) DEVICE — FLOSEAL HEMOSTATIC MATRIX, 5ML: Brand: FLOSEAL HEMOSTATIC MATRIX

## (undated) DEVICE — LIGHT HANDLE

## (undated) DEVICE — 3M(TM) MEDIPORE(TM) +PAD SOFT CLOTH ADHESIVE WOUND DRESSING 3566: Brand: 3M™ MEDIPORE™

## (undated) DEVICE — MARKER SKIN PREP RESIST STRL

## (undated) DEVICE — 3.0MM PRECISION ROUND

## (undated) DEVICE — STERILE POLYISOPRENE POWDER-FREE SURGICAL GLOVES: Brand: PROTEXIS

## (undated) DEVICE — LAMINECTOMY CDS: Brand: MEDLINE INDUSTRIES, INC.

## (undated) DEVICE — SUTURE VICRYL 0 CT-2

## (undated) DEVICE — EXOFIN TISSUE ADHESIVE 1.0ML

## (undated) DEVICE — SUTURE VICRYL 3-0 RB-1

## (undated) DEVICE — SUTURE VICRYL 2-0 CT-2

## (undated) DEVICE — SOL  .9 1000ML BTL

## (undated) NOTE — LETTER
03/23/22    To Whom It May Concern:    Bogdan Valladares was evaluated in our office on 3/14/22 for back pain. She has been off work since 3/7/22 due to the severity of her back pain. She may work from home from 3/28-4/1/22 if she is able to work based on her pain level.       Sincerely,      Ike Mao PA-C

## (undated) NOTE — Clinical Note
TCM call completed. The patient is scheduled for a TCM-HFU appointment on 4/11/2022. The patient reported post-op pain has been well controlled. The patient did establish with HH and pt reports IV therapy/PICC line care has been completed per recommendations. Thank you.

## (undated) NOTE — MR AVS SNAPSHOT
[facility] [address], [address] [id number]               Thank you for choosing us for your health care visit with Sissy Reid PA-C.   We are glad to serve you and happy to provide you with this These medications were sent to [facility] [address], [address] AT [address] [facility].., [phone], [phone]  [address] [facility] [address]    Hours:  24-hours Phone:  Cb [facility] Tips for increasing your physical activity – Adults who are physically active are less likely to develop some chronic diseases than adults who are inactive.      HOW TO GET STARTED: HOW TO STAY MOTIVATED:   Start activities slowly and build up over time Do

## (undated) NOTE — LETTER
03/02/20    To Whom It May Concern:      Ana Maria Cosme was seen here today for an illness. She will return to work when feeling better. Her symptoms are suspicious for influenza.       Sincerely,      Stirum Rudy GOOD